# Patient Record
Sex: MALE | Race: BLACK OR AFRICAN AMERICAN | NOT HISPANIC OR LATINO | Employment: OTHER | URBAN - METROPOLITAN AREA
[De-identification: names, ages, dates, MRNs, and addresses within clinical notes are randomized per-mention and may not be internally consistent; named-entity substitution may affect disease eponyms.]

---

## 2017-04-06 ENCOUNTER — TELEPHONE (OUTPATIENT)
Dept: MEDICAL GROUP | Facility: PHYSICIAN GROUP | Age: 82
End: 2017-04-06

## 2017-04-06 NOTE — TELEPHONE ENCOUNTER
NEW PATIENT PRE-VISIT PLANNING    Called Jt Arevalo in order to verify health topics prior to the New appointment.     1.  All medications were updated? No Caregiver will bring in list she did not have it at this time.    2.  Allergies were updated? yes    3.  All care teams were updated? N\A       •   Gait devices, O2, CPAP, etc: yes Walker       •   Eye professional: N\A       •   Other specialists (GYN, cardiology, endo, etc): N\A    4.  All pharmacies were updated? yes          No current outpatient prescriptions on file.     No current facility-administered medications for this visit.       5.  Patient may be due for these Health Maintenance Topics (update any if possible):          There are no preventive care reminders to display for this patient.                       6.  Immunizations were updated in Epic using WebIZ?: No documentation in Web IZ        a. Web Iz Recommendations: Unknown          7.  Former PCP records requested?: no       a. If yes, request was sent to        8.  Notes to provider or MA:       a. Caregiver has medical records on pt she will bring to the appointment        b. Caregiver will bring in Medication list, she did not have it while we completed the NP PVP       Pt was encouraged to keep the appointment and to arrive at least 15-20 minutes early.

## 2017-04-17 ENCOUNTER — HOSPITAL ENCOUNTER (OUTPATIENT)
Dept: LAB | Facility: MEDICAL CENTER | Age: 82
End: 2017-04-17
Attending: FAMILY MEDICINE
Payer: MEDICARE

## 2017-04-17 ENCOUNTER — OFFICE VISIT (OUTPATIENT)
Dept: MEDICAL GROUP | Facility: PHYSICIAN GROUP | Age: 82
End: 2017-04-17
Payer: MEDICARE

## 2017-04-17 VITALS
HEART RATE: 56 BPM | WEIGHT: 138.01 LBS | HEIGHT: 65 IN | OXYGEN SATURATION: 98 % | RESPIRATION RATE: 16 BRPM | SYSTOLIC BLOOD PRESSURE: 118 MMHG | BODY MASS INDEX: 22.99 KG/M2 | TEMPERATURE: 97.2 F | DIASTOLIC BLOOD PRESSURE: 68 MMHG

## 2017-04-17 DIAGNOSIS — I25.10 CORONARY ARTERY DISEASE INVOLVING NATIVE CORONARY ARTERY OF NATIVE HEART WITHOUT ANGINA PECTORIS: ICD-10-CM

## 2017-04-17 DIAGNOSIS — K21.9 GASTROESOPHAGEAL REFLUX DISEASE WITHOUT ESOPHAGITIS: ICD-10-CM

## 2017-04-17 DIAGNOSIS — G31.84 MILD COGNITIVE IMPAIRMENT: ICD-10-CM

## 2017-04-17 DIAGNOSIS — Z87.39 HISTORY OF GOUT: ICD-10-CM

## 2017-04-17 DIAGNOSIS — R09.89 RUNNY NOSE: ICD-10-CM

## 2017-04-17 DIAGNOSIS — Z86.73 HISTORY OF CVA (CEREBROVASCULAR ACCIDENT): ICD-10-CM

## 2017-04-17 DIAGNOSIS — I10 ESSENTIAL HYPERTENSION: ICD-10-CM

## 2017-04-17 DIAGNOSIS — I69.30 HISTORY OF CEREBROVASCULAR ACCIDENT (CVA) WITH RESIDUAL DEFICIT: ICD-10-CM

## 2017-04-17 DIAGNOSIS — Z85.46 HISTORY OF PROSTATE CANCER: ICD-10-CM

## 2017-04-17 LAB
ALBUMIN SERPL BCP-MCNC: 3.9 G/DL (ref 3.2–4.9)
ALBUMIN/GLOB SERPL: 1.3 G/DL
ALP SERPL-CCNC: 106 U/L (ref 30–99)
ALT SERPL-CCNC: 27 U/L (ref 2–50)
ANION GAP SERPL CALC-SCNC: 8 MMOL/L (ref 0–11.9)
AST SERPL-CCNC: 21 U/L (ref 12–45)
BILIRUB SERPL-MCNC: 0.5 MG/DL (ref 0.1–1.5)
BUN SERPL-MCNC: 24 MG/DL (ref 8–22)
CALCIUM SERPL-MCNC: 9.7 MG/DL (ref 8.5–10.5)
CHLORIDE SERPL-SCNC: 101 MMOL/L (ref 96–112)
CHOLEST SERPL-MCNC: 245 MG/DL (ref 100–199)
CO2 SERPL-SCNC: 30 MMOL/L (ref 20–33)
CREAT SERPL-MCNC: 1.61 MG/DL (ref 0.5–1.4)
GFR SERPL CREATININE-BSD FRML MDRD: 41 ML/MIN/1.73 M 2
GLOBULIN SER CALC-MCNC: 3 G/DL (ref 1.9–3.5)
GLUCOSE SERPL-MCNC: 88 MG/DL (ref 65–99)
HDLC SERPL-MCNC: 50 MG/DL
LDLC SERPL CALC-MCNC: 145 MG/DL
POTASSIUM SERPL-SCNC: 4.4 MMOL/L (ref 3.6–5.5)
PROT SERPL-MCNC: 6.9 G/DL (ref 6–8.2)
SODIUM SERPL-SCNC: 139 MMOL/L (ref 135–145)
TRIGL SERPL-MCNC: 252 MG/DL (ref 0–149)
URATE SERPL-MCNC: 4.5 MG/DL (ref 2.5–8.3)

## 2017-04-17 PROCEDURE — 99204 OFFICE O/P NEW MOD 45 MIN: CPT | Performed by: FAMILY MEDICINE

## 2017-04-17 PROCEDURE — 1101F PT FALLS ASSESS-DOCD LE1/YR: CPT | Performed by: FAMILY MEDICINE

## 2017-04-17 PROCEDURE — 80053 COMPREHEN METABOLIC PANEL: CPT

## 2017-04-17 PROCEDURE — 84550 ASSAY OF BLOOD/URIC ACID: CPT

## 2017-04-17 PROCEDURE — 36415 COLL VENOUS BLD VENIPUNCTURE: CPT

## 2017-04-17 PROCEDURE — 1036F TOBACCO NON-USER: CPT | Performed by: FAMILY MEDICINE

## 2017-04-17 PROCEDURE — 4040F PNEUMOC VAC/ADMIN/RCVD: CPT | Mod: 8P | Performed by: FAMILY MEDICINE

## 2017-04-17 PROCEDURE — 80061 LIPID PANEL: CPT | Mod: GA

## 2017-04-17 PROCEDURE — G8420 CALC BMI NORM PARAMETERS: HCPCS | Performed by: FAMILY MEDICINE

## 2017-04-17 PROCEDURE — G8599 NO ASA/ANTIPLAT THER USE RNG: HCPCS | Performed by: FAMILY MEDICINE

## 2017-04-17 PROCEDURE — G8432 DEP SCR NOT DOC, RNG: HCPCS | Performed by: FAMILY MEDICINE

## 2017-04-17 RX ORDER — INDAPAMIDE 2.5 MG/1
2.5 TABLET ORAL DAILY
Refills: 1 | COMMUNITY
Start: 2017-04-13 | End: 2017-05-16

## 2017-04-17 RX ORDER — ALLOPURINOL 300 MG/1
300 TABLET ORAL DAILY
Refills: 1 | COMMUNITY
Start: 2017-04-13 | End: 2017-05-16 | Stop reason: SDUPTHER

## 2017-04-17 RX ORDER — GABAPENTIN 100 MG/1
100 CAPSULE ORAL 3 TIMES DAILY
Refills: 0 | COMMUNITY
Start: 2017-03-13 | End: 2017-05-16

## 2017-04-17 RX ORDER — COLCHICINE 0.6 MG/1
0.6 TABLET ORAL DAILY
Refills: 3 | COMMUNITY
Start: 2017-04-13 | End: 2017-05-16

## 2017-04-17 RX ORDER — LATANOPROST 50 UG/ML
0.01 SOLUTION/ DROPS OPHTHALMIC DAILY
Refills: 1 | COMMUNITY
Start: 2017-03-13 | End: 2018-07-11 | Stop reason: SDUPTHER

## 2017-04-17 RX ORDER — DONEPEZIL HYDROCHLORIDE 23 MG/1
23 TABLET, FILM COATED ORAL DAILY
Refills: 2 | COMMUNITY
Start: 2017-04-13 | End: 2017-06-27 | Stop reason: SDUPTHER

## 2017-04-17 RX ORDER — ATENOLOL 50 MG/1
50 TABLET ORAL DAILY
Refills: 1 | COMMUNITY
Start: 2017-04-13 | End: 2017-05-16 | Stop reason: SDUPTHER

## 2017-04-17 RX ORDER — OMEPRAZOLE 20 MG/1
20 CAPSULE, DELAYED RELEASE ORAL DAILY
Refills: 0 | COMMUNITY
Start: 2017-04-06 | End: 2017-05-16

## 2017-04-17 ASSESSMENT — PATIENT HEALTH QUESTIONNAIRE - PHQ9: CLINICAL INTERPRETATION OF PHQ2 SCORE: 0

## 2017-04-17 NOTE — PROGRESS NOTES
"Jt Arevalo is a 83 y.o. male here to establish care and discuss runny nose and dementia. He is accompanied by his nephew, Tc, who helps coordinate his care.     HPI:  Jt is a charming 83-year-old male here to establish. He recently moved from [place] to be close to his nephew. He resides at an assisted living facility. He was born in [place] and is a former . When I inquired what he did for a living, he tells me that he did \"everything.\"     Runny nose  This is patient's main concern today. He tells me that for several years, he has had a \"runny nose.\" It appears that it may have started shortly after his stroke. Occurs intermittently. He denies nose bleeds. Does not worsen when he eats. He does not use any over-the-counter nasal sprays or other medications. He tells me that he does not have allergies.    Mild cognitive impairment  According to Tc, patient has \"mild dementia.\" He is currently taking donepezil. Mainly has issues with short-term memory.    Essential hypertension  Stable. Currently taking atenolol 50mg, indapamide 2.5mg and nifedical XL 30mg as directed. He has been on this regimen for many years. Denies lightheadedness, vision changes, headache, palpitations or leg swelling.    History of cerebrovascular accident (CVA) with residual deficit  Hospitalized in 2006 for acute stroke. Tc tells me that he had carotid artery stenosis and had an operation. He is not currently on aspirin or other blood thinner. He is supposed to be on atorvastatin, but this is not on his most recent medication list. He does have residual right leg weakness. Uses a walker for help with ambulation. No recent falls.    Coronary artery disease involving native coronary artery of native heart without angina pectoris  Per patient history. He tells me he has a cardiac stent. Unclear if he is on aspirin, blood thinner or statin medication.    History of prostate cancer  Per patient history. He was treated " with brachytherapy. Has not had an recent PSA tests. Denies abdominal pain, urination issues or back pain.    Gout  Denies recent flare-ups. Currently taking allopurinol 300 mg. He also has colchicine on his medication list. Jt tells me he takes it everyday. Denies side effects from medication.     Gastroesophageal reflux disease without esophagitis  Taking omeprazole daily. No early satiety, unintentional weight loss, choking, persistent burning pain in chest or upper abdomen.    Current medicines (including changes today)  Current Outpatient Prescriptions   Medication Sig Dispense Refill   • allopurinol (ZYLOPRIM) 300 MG Tab Take 300 mg by mouth every day.  1   • atenolol (TENORMIN) 50 MG Tab Take 50 mg by mouth every day.  1   • colchicine (COLCRYS) 0.6 MG Tab Take 0.6 mg by mouth every day.  3   • Donepezil HCl 23 MG Tab Take 23 mg by mouth every day.  2   • gabapentin (NEURONTIN) 100 MG Cap Take 100 mg by mouth 3 times a day.  0   • indapamide (LOZOL) 2.5 MG Tab Take 2.5 mg by mouth every day.  1   • latanoprost (XALATAN) 0.005 % Solution Place 0.005 Drops in both eyes every day.  1   • NIFEDICAL XL 30 MG tablet Take 30 mg by mouth every day.  0   • omeprazole (PRILOSEC) 20 MG delayed-release capsule Take 20 mg by mouth every day.  0     No current facility-administered medications for this visit.     He  has a past medical history of Anxiety; Arthritis; Cancer (CMS-HCC); Cataract; Depression; Glaucoma; Hypertension; IBD (inflammatory bowel disease); Muscle disorder; and Stroke (CMS-HCC).  He  has past surgical history that includes eye surgery; stent placement; and prostatectomy, radical retro.  Social History   Substance Use Topics   • Smoking status: Former Smoker     Quit date: 04/06/1991   • Smokeless tobacco: Never Used   • Alcohol Use: 0.6 oz/week     1 Shots of liquor per week      Comment: rare     Social History     Social History Narrative     Family History   Problem Relation Age of Onset   • No  "Known Problems Mother    • No Known Problems Father    • No Known Problems Sister    • No Known Problems Brother    • No Known Problems Brother      Family Status   Relation Status Death Age   • Mother     • Father     • Sister Alive    • Brother     • Brother       ROS  Constitutional: Negative for fever, chills and malaise/fatigue.   HENT: See HPI.  Eyes: Negative for pain.   Respiratory: Negative for cough and shortness of breath.    Cardiovascular: Negative for leg swelling.   Gastrointestinal: Negative for nausea, vomiting, abdominal pain and diarrhea.   Genitourinary: Negative for dysuria and hematuria.   Skin: Negative for rash.   Neurological: Negative for dizziness, focal weakness and headaches.   Endo/Heme/Allergies: Does not bruise/bleed easily.   Psychiatric/Behavioral: Negative for depression.  The patient is not nervous/anxious.       Objective:     Physical Exam:  Blood pressure 118/68, pulse 56, temperature 36.2 °C (97.2 °F), resp. rate 16, height 1.638 m (5' 4.5\"), weight 62.6 kg (138 lb 0.1 oz), SpO2 98 %. Body mass index is 23.33 kg/(m^2).  Constitutional: Alert, elderly male in no distress.  Skin: Warm, dry, good turgor, no rashes in visible areas.  Eye: Equal, round and reactive, conjunctiva clear, lids normal.  ENMT: Hearing aide in right ear. TM's clear bilaterally, lips without lesions, good dentition, oropharynx clear.  Neck: Trachea midline, no masses, no thyromegaly. No cervical or supraclavicular lymphadenopathy.  Respiratory: Unlabored respiratory effort, lungs clear to auscultation, no wheezes, no ronchi.  Cardiovascular: Normal S1, S2, no murmur, no edema.  Abdomen: Soft, non-tender, no masses, no hepatosplenomegaly.  MSK: Steady gait with FWW. MARTÍNEZ with full ROM.  Neuro: Alert and oriented x 3. DTRs 2+ and symmetric. No cranial nerve deficit. Strength decreased in right leg. Sensation intact.  Psych: Normal affect and mood.    Assessment and Plan:     1. " Runny nose  Unclear etiology. Reassuring exam. Supportive care and trial of Flonase advised.    2. Mild cognitive impairment  Short-term memory. Resides in assisted living and able to perform ADL's. Continue donepezil and continue to monitor.     3. Essential hypertension  Blood pressure well-controlled. Labs as indicated. Consider decreasing or eliminating one of his antihypertensives at a future visit. Discussed decreasing salt intake. Emphasized benefits of exercise and diet. Continue to monitor.  - COMP METABOLIC PANEL; Future  - LIPID PROFILE; Future    4. History of cerebrovascular accident (CVA) with residual deficit  Per patient history. Advised restarting aspirin and ensuring he is on statin therapy. Instructed to bring in medication packets at next appointment for review.  - COMP METABOLIC PANEL; Future  - LIPID PROFILE; Future    5. Coronary artery disease involving native coronary artery of native heart without angina pectoris  He is s/p cardiac stent. Check labs. Again, advised aspirin and statin treatment.  - COMP METABOLIC PANEL; Future  - LIPID PROFILE; Future    6. History of prostate cancer  Per patient history. Requesting records. Hold off on PSA test for now.    7. History of gout  No recent exacerbations. Unclear why he is taking colchicine daily. Check uric acid. Consider discontinuing colchicine at next visit.  - URIC ACID; Future    8. Gastroesophageal reflux disease without esophagitis  No recent symptoms. Consider discontinuing PPI at next appointment.    Records requested from previous PCP.  Followup: Return in about 4 weeks (around 5/15/2017) for med management, f/u labs, short.         PLEASE NOTE: This dictation was created using voice recognition software. I have made every reasonable attempt to correct obvious errors, but I expect that there are errors of grammar and possibly content that I did not discover before finalizing the note.

## 2017-04-17 NOTE — MR AVS SNAPSHOT
"        Jt Arevalo   2017 10:20 AM   Office Visit   MRN: [id number]    Department:  [facility]   Dept Phone:  [phone]    Description:  Male : 1933   Provider:  Kathrine Melgoza M.D.           Reason for Visit     Allergic Reaction     Runny Nose     Dementia           Allergies as of 2017     No Known Allergies      You were diagnosed with     History of CVA (cerebrovascular accident)   [507494]       History of prostate cancer   [830162]       Essential hypertension   [5754509]       History of gout   [187721]       Coronary artery disease involving native coronary artery of native heart without angina pectoris   [0602942]       Mild cognitive impairment   [051698]       History of cerebrovascular accident (CVA) with residual deficit   [7142238]         Vital Signs     Blood Pressure Pulse Temperature Respirations Height Weight    118/68 mmHg 56 36.2 °C (97.2 °F) 16 1.638 m (5' 4.5\") 62.6 kg (138 lb 0.1 oz)    Body Mass Index Oxygen Saturation Smoking Status             23.33 kg/m2 98% Former Smoker         Basic Information     Date Of Birth Sex Race Ethnicity Preferred Language    1933 Male Black or  Non- English      Your appointments     May 16, 2017 10:40 AM   Established Patient with Kathrine Melgoza M.D.   [facility] (--)    [address]  [address]  [facility] [address] [phone]           You will be receiving a confirmation call a few days before your appointment from our automated call confirmation system.              Problem List              ICD-10-CM Priority Class Noted - Resolved    History of prostate cancer Z85.46   2017 - Present    Essential hypertension I10   2017 - Present    History of gout Z87.39   2017 - Present    Coronary artery disease involving native coronary artery of native heart without angina pectoris I25.10   2017 - Present    Mild cognitive impairment G31.84   2017 - Present   " History of cerebrovascular accident (CVA) with residual deficit I69.30   4/17/2017 - Present      Health Maintenance        Date Due Completion Dates    IMM DTaP/Tdap/Td Vaccine (1 - Tdap) 12/14/1952 ---    IMM ZOSTER VACCINE 12/14/1993 ---    IMM PNEUMOCOCCAL 65+ (ADULT) LOW/MEDIUM RISK SERIES (1 of 2 - PCV13) 12/14/1998 ---            Current Immunizations     No immunizations on file.      Below and/or attached are the medications your provider expects you to take. Review all of your home medications and newly ordered medications with your provider and/or pharmacist. Follow medication instructions as directed by your provider and/or pharmacist. Please keep your medication list with you and share with your provider. Update the information when medications are discontinued, doses are changed, or new medications (including over-the-counter products) are added; and carry medication information at all times in the event of emergency situations     Allergies:  No Known Allergies          Medications  Valid as of: April 17, 2017 - 10:31 AM    Generic Name Brand Name Tablet Size Instructions for use    Allopurinol (Tab) ZYLOPRIM 300 MG Take 300 mg by mouth every day.        Atenolol (Tab) TENORMIN 50 MG Take 50 mg by mouth every day.        Colchicine (Tab) COLCRYS 0.6 MG Take 0.6 mg by mouth every day.        Donepezil HCl (Tab) Donepezil HCl 23 MG Take 23 mg by mouth every day.        Gabapentin (Cap) NEURONTIN 100 MG Take 100 mg by mouth 3 times a day.        Indapamide (Tab) LOZOL 2.5 MG Take 2.5 mg by mouth every day.        Latanoprost (Solution) XALATAN 0.005 % Place 0.005 Drops in both eyes every day.        NIFEdipine (TABLET SR 24 HR) NIFEDICAL XL 30 MG Take 30 mg by mouth every day.        Omeprazole (CAPSULE DELAYED RELEASE) PRILOSEC 20 MG Take 20 mg by mouth every day.        .                 Medicines prescribed today were sent to:     [facility] - [address] [place] [address]    Phone: [phone] Fax: [phone]    Open 24 Hours?: No      Medication refill instructions:       If your prescription bottle indicates you have medication refills left, it is not necessary to call your provider’s office. Please contact your pharmacy and they will refill your medication.    If your prescription bottle indicates you do not have any refills left, you may request refills at any time through one of the following ways: The online NOW! Innovations system (except Urgent Care), by calling your provider’s office, or by asking your pharmacy to contact your provider’s office with a refill request. Medication refills are processed only during regular business hours and may not be available until the next business day. Your provider may request additional information or to have a follow-up visit with you prior to refilling your medication.   *Please Note: Medication refills are assigned a new Rx number when refilled electronically. Your pharmacy may indicate that no refills were authorized even though a new prescription for the same medication is available at the pharmacy. Please request the medicine by name with the pharmacy before contacting your provider for a refill.        Your To Do List     Future Labs/Procedures Complete By Expires    COMP METABOLIC PANEL  As directed 4/18/2018    LIPID PROFILE  As directed 4/18/2018    URIC ACID  As directed 4/18/2018         NOW! Innovations Access Code: [email or web address]  Expires: 5/17/2017  9:46 AM    NOW! Innovations  A secure, online tool to manage your health information     [employer]’s NOW! Innovations® is a secure, online tool that connects you to your personalized health information from the privacy of your home -- day or night - making it very easy for you to manage your healthcare. Once the activation process is completed, you can even access your medical information using the NOW! Innovations kristina, which is available for free in the Apple Kristina store or [employer]  Play store.     HomeSav provides the following levels of access (as shown below):   My Chart Features   [facility] Doctor [facility]  Specialists [facility]  Urgent  Care Non-[facility]  Primary Care  Doctor   Email your healthcare team securely and privately 24/7 X X X    Manage appointments: schedule your next appointment; view details of past/upcoming appointments X      Request prescription refills. X      View recent personal medical records, including lab and immunizations X X X X   View health record, including health history, allergies, medications X X X X   Read reports about your outpatient visits, procedures, consult and ER notes X X X X   See your discharge summary, which is a recap of your hospital and/or ER visit that includes your diagnosis, lab results, and care plan. X X       How to register for HomeSav:  1. Go to  [email or web address].  2. Click on the Sign Up Now box, which takes you to the New Member Sign Up page. You will need to provide the following information:  a. Enter your HomeSav Access Code exactly as it appears at the top of this page. (You will not need to use this code after you’ve completed the sign-up process. If you do not sign up before the expiration date, you must request a new code.)   b. Enter your date of birth.   c. Enter your home email address.   d. Click Submit, and follow the next screen’s instructions.  3. Create a HomeSav ID. This will be your HomeSav login ID and cannot be changed, so think of one that is secure and easy to remember.  4. Create a HomeSav password. You can change your password at any time.  5. Enter your Password Reset Question and Answer. This can be used at a later time if you forget your password.   6. Enter your e-mail address. This allows you to receive e-mail notifications when new information is available in HomeSav.  7. Click Sign Up. You can now view your health information.    For assistance activating your HomeSav account, call [phone]

## 2017-04-17 NOTE — Clinical Note
[facility]  Kathrine Melgoza M.D.  [address]  [place] [address]  Fax: [phone]   Authorization for Release/Disclosure of   Protected Health Information   Name: HECTOR AREVALO : 1933 SSN: [id number]   Address: [facility] [address] [place] [address] Phone:    [phone] (home)    I authorize the entity listed below to release/disclose the PHI below to:   [facility]/Kathrine Melgoza M.D. and Kathrine Melgoza M.D.   Provider or Entity Name:      [place], [place]  Phone:      Fax:     Reason for request: continuity of care   Information to be released:    [  ] LAST COLONOSCOPY,  including any PATH REPORT and follow-up  [  ] LAST FIT/COLOGUARD RESULT [  ] LAST DEXA  [  ] LAST MAMMOGRAM  [  ] LAST PAP  [  ] LAST LABS [  ] RETINA EXAM REPORT  [  ] IMMUNIZATION RECORDS  [  ] Release all info      [  ] Check here and initial the line next to each item to release ALL health information INCLUDING  _____ Care and treatment for drug and / or alcohol abuse  _____ HIV testing, infection status, or AIDS  _____ Genetic Testing    DATES OF SERVICE OR TIME PERIOD TO BE DISCLOSED: _____________  I understand and acknowledge that:  * This Authorization may be revoked at any time by you in writing, except if your health information has already been used or disclosed.  * Your health information that will be used or disclosed as a result of you signing this authorization could be re-disclosed by the recipient. If this occurs, your re-disclosed health information may no longer be protected by State or Federal laws.  * You may refuse to sign this Authorization. Your refusal will not affect your ability to obtain treatment.  * This Authorization becomes effective upon signing and will  on (date) __________.      If no date is indicated, this Authorization will  one (1) year from the signature date.    Name: Hector Arevalo    Signature:   Date:     2017       PLEASE  FAX REQUESTED RECORDS BACK TO: [phone]

## 2017-04-18 ENCOUNTER — TELEPHONE (OUTPATIENT)
Dept: MEDICAL GROUP | Facility: PHYSICIAN GROUP | Age: 82
End: 2017-04-18

## 2017-04-18 NOTE — TELEPHONE ENCOUNTER
1. Caller Name: Jt Arevalo                      Call Back Number: [phone] (home)     2. Message: Patient notified of the results.     3. Patient approves office to leave a detailed voicemail/MyChart message: N\A

## 2017-04-18 NOTE — TELEPHONE ENCOUNTER
----- Message from Kathrine Melgoza M.D. sent at 4/18/2017  6:37 AM PDT -----  Please let patient or his nephew (Tc) know that Jt's labs show a slightly decreased kidney function and elevated cholesterol. We can discuss further at his follow-up appointment.  Kathrine Melgoza M.D.

## 2017-05-16 ENCOUNTER — OFFICE VISIT (OUTPATIENT)
Dept: MEDICAL GROUP | Facility: PHYSICIAN GROUP | Age: 82
End: 2017-05-16
Payer: MEDICARE

## 2017-05-16 VITALS
WEIGHT: 136.91 LBS | TEMPERATURE: 97.2 F | DIASTOLIC BLOOD PRESSURE: 66 MMHG | RESPIRATION RATE: 16 BRPM | HEIGHT: 65 IN | HEART RATE: 60 BPM | BODY MASS INDEX: 22.81 KG/M2 | OXYGEN SATURATION: 98 % | SYSTOLIC BLOOD PRESSURE: 134 MMHG

## 2017-05-16 DIAGNOSIS — I10 ESSENTIAL HYPERTENSION: ICD-10-CM

## 2017-05-16 DIAGNOSIS — I25.10 CORONARY ARTERY DISEASE INVOLVING NATIVE CORONARY ARTERY OF NATIVE HEART WITHOUT ANGINA PECTORIS: ICD-10-CM

## 2017-05-16 DIAGNOSIS — R94.4 DECREASED GFR: ICD-10-CM

## 2017-05-16 DIAGNOSIS — Z79.899 POLYPHARMACY: ICD-10-CM

## 2017-05-16 DIAGNOSIS — K21.9 GASTROESOPHAGEAL REFLUX DISEASE WITHOUT ESOPHAGITIS: ICD-10-CM

## 2017-05-16 DIAGNOSIS — G31.84 MILD COGNITIVE IMPAIRMENT: ICD-10-CM

## 2017-05-16 DIAGNOSIS — E78.00 PURE HYPERCHOLESTEROLEMIA: ICD-10-CM

## 2017-05-16 DIAGNOSIS — Z87.39 HISTORY OF GOUT: ICD-10-CM

## 2017-05-16 DIAGNOSIS — I69.30 HISTORY OF CEREBROVASCULAR ACCIDENT (CVA) WITH RESIDUAL DEFICIT: ICD-10-CM

## 2017-05-16 PROCEDURE — 4040F PNEUMOC VAC/ADMIN/RCVD: CPT | Mod: 8P | Performed by: FAMILY MEDICINE

## 2017-05-16 PROCEDURE — G8420 CALC BMI NORM PARAMETERS: HCPCS | Performed by: FAMILY MEDICINE

## 2017-05-16 PROCEDURE — 1036F TOBACCO NON-USER: CPT | Performed by: FAMILY MEDICINE

## 2017-05-16 PROCEDURE — G8432 DEP SCR NOT DOC, RNG: HCPCS | Performed by: FAMILY MEDICINE

## 2017-05-16 PROCEDURE — G8598 ASA/ANTIPLAT THER USED: HCPCS | Performed by: FAMILY MEDICINE

## 2017-05-16 PROCEDURE — 99214 OFFICE O/P EST MOD 30 MIN: CPT | Performed by: FAMILY MEDICINE

## 2017-05-16 PROCEDURE — 1101F PT FALLS ASSESS-DOCD LE1/YR: CPT | Performed by: FAMILY MEDICINE

## 2017-05-16 RX ORDER — ATENOLOL 50 MG/1
50 TABLET ORAL DAILY
Qty: 30 TAB | Refills: 11 | Status: SHIPPED | OUTPATIENT
Start: 2017-05-16 | End: 2017-10-25

## 2017-05-16 RX ORDER — ATORVASTATIN CALCIUM 10 MG/1
10 TABLET, FILM COATED ORAL EVERY MORNING
Qty: 30 TAB | Refills: 11 | Status: SHIPPED | OUTPATIENT
Start: 2017-05-16 | End: 2017-08-22

## 2017-05-16 RX ORDER — ALLOPURINOL 300 MG/1
300 TABLET ORAL DAILY
Qty: 30 TAB | Refills: 11 | Status: SHIPPED | OUTPATIENT
Start: 2017-05-16 | End: 2017-08-22

## 2017-05-16 NOTE — ASSESSMENT & PLAN NOTE
Patient has not had heartburn symptoms for several years. He is taking omeprazole daily. No adverse effects.

## 2017-05-16 NOTE — ASSESSMENT & PLAN NOTE
Patient's cholesterol elevated on recent lab work. Reviewed his list of medications and I do not see a cholesterol lowering medication. His caregivers believe that he was on a cholesterol-lowering medication in the past. They think that maybe with the move, the medication was lost. He does have a history of heart attack and stroke.

## 2017-05-16 NOTE — ASSESSMENT & PLAN NOTE
Patient has a history of gout, but has not had any attacks in more than 10 years. He is currently taking allopurinol 30 mg daily and colchicine 0.6 mg daily.

## 2017-05-16 NOTE — ASSESSMENT & PLAN NOTE
Stable. Patient's caregiver, Nohemi, does state that he does not perform ADLs as well as he used to. He spends most of his day lying in bed and watching TV. He is at an assisted facility for dementia.

## 2017-05-16 NOTE — PROGRESS NOTES
Subjective:   Jt Arevalo is a 83 y.o. male here today for follow-up medication management and lab results. He is accompanied by his caregivers, Boaz and Nohemi.    Decreased GFR  On patient's most recent blood work, his creatinine was elevated at 1.6 and GFR was decreased at 50. His caregivers deny ever hearing that he had issues with his kidneys. He was taking colchicine daily.  he does admit to not drinking a lot of water. He does have significant history of coronary artery disease, hypertension and CVA.    Pure hypercholesterolemia  Patient's cholesterol elevated on recent lab work. Reviewed his list of medications and I do not see a cholesterol lowering medication. His caregivers believe that he was on a cholesterol-lowering medication in the past. They think that maybe with the move, the medication was lost. He does have a history of heart attack and stroke.    Essential hypertension  Stable blood pressure. He is currently on 3 antihypertensive medications. Denies adverse effects.    Mild cognitive impairment  Stable. Patient's caregiver, Nohemi, does state that he does not perform ADLs as well as he used to. He spends most of his day lying in bed and watching TV. He is at an assisted facility for dementia.    Gastroesophageal reflux disease without esophagitis  Patient has not had heartburn symptoms for several years. He is taking omeprazole daily. No adverse effects.    History of gout  Patient has a history of gout, but has not had any attacks in more than 10 years. He is currently taking allopurinol 30 mg daily and colchicine 0.6 mg daily.     Current medicines (including changes today)  Current Outpatient Prescriptions   Medication Sig Dispense Refill   • aspirin EC (ECOTRIN) 81 MG Tablet Delayed Response Take 81 mg by mouth every day.     • atorvastatin (LIPITOR) 10 MG Tab Take 1 Tab by mouth every morning. 30 Tab 11   • allopurinol (ZYLOPRIM) 300 MG Tab Take 300 mg by mouth every day.  1   • atenolol  "(TENORMIN) 50 MG Tab Take 50 mg by mouth every day.  1   • Donepezil HCl 23 MG Tab Take 23 mg by mouth every day.  2   • latanoprost (XALATAN) 0.005 % Solution Place 0.005 Drops in both eyes every day.  1   • NIFEDICAL XL 30 MG tablet Take 30 mg by mouth every day.  0     No current facility-administered medications for this visit.     He  has a past medical history of Anxiety; Arthritis; Cancer (CMS-HCC); Cataract; Depression; Glaucoma; Hypertension; IBD (inflammatory bowel disease); Muscle disorder; and Stroke (CMS-[facility]).    ROS   See above. No chest pain, no shortness of breath, no abdominal pain.     Objective:     Physical Exam:  Blood pressure 134/66, pulse 60, temperature 36.2 °C (97.2 °F), resp. rate 16, height 1.638 m (5' 4.5\"), weight 62.1 kg (136 lb 14.5 oz), SpO2 98 %. Body mass index is 23.15 kg/(m^2).   Constitutional: Alert, elderly male in no distress.  Skin: Warm, dry, good turgor, no rashes in visible areas.  Eye: Conjunctiva clear, lids normal.  ENMT: Hearing aide in right ear. Lips without lesions, good dentition, oropharynx clear.  Neck: Trachea midline, no masses, no thyromegaly.  Respiratory: Unlabored respiratory effort, lungs clear to auscultation, no wheezes, no ronchi.  Cardiovascular: Normal S1, S2, no murmur, no edema.  Abdomen: Soft, non-tender, no masses, no hepatosplenomegaly.  MSK: Steady gait with FWW. MARTÍNEZ with full ROM.  Neuro: Alert and oriented x 3. DTRs 2+ and symmetric. No cranial nerve deficit. Strength decreased in right leg. Sensation intact.  Psych: Normal affect and mood.    Assessment and Plan:     1. Coronary artery disease involving native coronary artery of native heart without angina pectoris  2. History of cerebrovascular accident (CVA) with residual deficit  3. Pure hypercholesterolemia  Cholesterol noted to be elevated on recent lab work. He does have a history of both heart attack and CVA and it is recommended that he continue statin medication. I prescribed him a " small amount of atorvastatin. Possible adverse effects discussed.  - atorvastatin (LIPITOR) 10 MG Tab; Take 1 Tab by mouth every morning.  Dispense: 30 Tab; Refill: 11    4. Decreased GFR  This is a new problem. Elevated creatinine and decreased GFR noted on recent lab work. Unclear at this time if he has chronic kidney disease or may simply have an abnormality due to medication side effect and dehydration. We'll recheck BMP in 2 months. This will be nonfasting.  - BASIC METABOLIC PANEL; Future    5. Essential hypertension  Well-controlled. We'll discontinue indapamide. Continue atenolol and Nifedical.  - BASIC METABOLIC PANEL; Future    6. Mild cognitive impairment  Chronic and stable. Continue donepezil.    7. Gastroesophageal reflux disease without esophagitis  Stable. Patient has not had symptoms in many years. Discontinue omeprazole.    8. History of gout  Stable. Patient has not had any recent flares in some time. Discontinue colchicine. Continue allopurinol.    9. Polypharmacy  Lengthy discussion with patient and his caregivers regarding his medications. He is at risk for polypharmacy and all unnecessary medications were discontinued today. At future visits, I hope to further widdle down his medication list.    Followup: Return in about 3 months (around 8/16/2017) for f/u med management, kidney results, short.         PLEASE NOTE: This dictation was created using voice recognition software. I have made every reasonable attempt to correct obvious errors, but I expect that there are errors of grammar and possibly content that I did not discover before finalizing the note.

## 2017-05-16 NOTE — ASSESSMENT & PLAN NOTE
On patient's most recent blood work, his creatinine was elevated at 1.6 and GFR was decreased at 50. His caregivers deny ever hearing that he had issues with his kidneys. He was taking colchicine daily.  he does admit to not drinking a lot of water. He does have significant history of coronary artery disease, hypertension and CVA.

## 2017-05-16 NOTE — MR AVS SNAPSHOT
"        Jt Arevalo   2017 10:40 AM   Office Visit   MRN: [id number]    Department:  [facility]   Dept Phone:  [phone]    Description:  Male : 1933   Provider:  Kathrine Melgoza M.D.           Reason for Visit     Results Labs       Allergies as of 2017     No Known Allergies      You were diagnosed with     Coronary artery disease involving native coronary artery of native heart without angina pectoris   [0662108]       History of cerebrovascular accident (CVA) with residual deficit   [5957265]       Pure hypercholesterolemia   [272.0.ICD-9-CM]       Decreased GFR   [597169]       Essential hypertension   [9098195]       Mild cognitive impairment   [162333]       Gastroesophageal reflux disease without esophagitis   [688437]       History of gout   [450278]         Vital Signs     Blood Pressure Pulse Temperature Respirations Height Weight    134/66 mmHg 60 36.2 °C (97.2 °F) 16 1.638 m (5' 4.5\") 62.1 kg (136 lb 14.5 oz)    Body Mass Index Oxygen Saturation Smoking Status             23.15 kg/m2 98% Former Smoker         Basic Information     Date Of Birth Sex Race Ethnicity Preferred Language    1933 Male Black or  Non- English      Your appointments     Aug 22, 2017 10:40 AM   Established Patient with Kathrine Melgoza M.D.   [facility] (--)    [address]  [address]  [facility] [id number] [phone]           You will be receiving a confirmation call a few days before your appointment from our automated call confirmation system.              Problem List              ICD-10-CM Priority Class Noted - Resolved    History of prostate cancer Z85.46   2017 - Present    Essential hypertension I10   2017 - Present    History of gout Z87.39   2017 - Present    Coronary artery disease involving native coronary artery of native heart without angina pectoris I25.10   2017 - Present    Mild cognitive impairment G31.84   2017 - " Present    History of cerebrovascular accident (CVA) with residual deficit I69.30   4/17/2017 - Present    Gastroesophageal reflux disease without esophagitis K21.9   4/17/2017 - Present    Decreased GFR R94.4   5/16/2017 - Present      Health Maintenance        Date Due Completion Dates    IMM DTaP/Tdap/Td Vaccine (1 - Tdap) 12/14/1952 ---    IMM ZOSTER VACCINE 12/14/1993 ---    IMM PNEUMOCOCCAL 65+ (ADULT) LOW/MEDIUM RISK SERIES (1 of 2 - PCV13) 12/14/1998 ---            Current Immunizations     No immunizations on file.      Below and/or attached are the medications your provider expects you to take. Review all of your home medications and newly ordered medications with your provider and/or pharmacist. Follow medication instructions as directed by your provider and/or pharmacist. Please keep your medication list with you and share with your provider. Update the information when medications are discontinued, doses are changed, or new medications (including over-the-counter products) are added; and carry medication information at all times in the event of emergency situations     Allergies:  No Known Allergies          Medications  Valid as of: May 16, 2017 - 10:57 AM    Generic Name Brand Name Tablet Size Instructions for use    Allopurinol (Tab) ZYLOPRIM 300 MG Take 300 mg by mouth every day.        Aspirin (Tablet Delayed Response) ECOTRIN 81 MG Take 81 mg by mouth every day.        Atenolol (Tab) TENORMIN 50 MG Take 50 mg by mouth every day.        Atorvastatin Calcium (Tab) LIPITOR 10 MG Take 1 Tab by mouth every morning.        Donepezil HCl (Tab) Donepezil HCl 23 MG Take 23 mg by mouth every day.        Latanoprost (Solution) XALATAN 0.005 % Place 0.005 Drops in both eyes every day.        NIFEdipine (TABLET SR 24 HR) NIFEDICAL XL 30 MG Take 30 mg by mouth every day.        .                 Medicines prescribed today were sent to:     [facility] [place] [place] - [address]    [address] [address]    Phone: [phone] Fax: [phone]    Open 24 Hours?: No      Medication refill instructions:       If your prescription bottle indicates you have medication refills left, it is not necessary to call your provider’s office. Please contact your pharmacy and they will refill your medication.    If your prescription bottle indicates you do not have any refills left, you may request refills at any time through one of the following ways: The online Pull system (except Urgent Care), by calling your provider’s office, or by asking your pharmacy to contact your provider’s office with a refill request. Medication refills are processed only during regular business hours and may not be available until the next business day. Your provider may request additional information or to have a follow-up visit with you prior to refilling your medication.   *Please Note: Medication refills are assigned a new Rx number when refilled electronically. Your pharmacy may indicate that no refills were authorized even though a new prescription for the same medication is available at the pharmacy. Please request the medicine by name with the pharmacy before contacting your provider for a refill.        Your To Do List     Future Labs/Procedures Complete By Any[employer]    BASIC METABOLIC PANEL  As directed 5/17/2018         Pull Access Code: [email or web address]  Expires: 5/17/2017  9:46 AM    Pull  A secure, online tool to manage your health information     [employer] Pull® is a secure, online tool that connects you to your personalized health information from the privacy of your home -- day or night - making it very easy for you to manage your healthcare. Once the activation process is completed, you can even access your medical information using the Pull kristina, which is available for free in the Apple Kristina store or Google Play store.     Pull provides the following levels of access (as shown  below):   My Chart Features   [facility] Doctor [facility]  Urgent  Care Non-[facility]  Primary Care  Doctor   Email your healthcare team securely and privately 24/7 X X X    Manage appointments: schedule your next appointment; view details of past/upcoming appointments X      Request prescription refills. X      View recent personal medical records, including lab and immunizations X X X X   View health record, including health history, allergies, medications X X X X   Read reports about your outpatient visits, procedures, consult and ER notes X X X X   See your discharge summary, which is a recap of your hospital and/or ER visit that includes your diagnosis, lab results, and care plan. X X       How to register for [employer]:  1. Go to  [email or web address].  2. Click on the Sign Up Now box, which takes you to the New Member Sign Up page. You will need to provide the following information:  a. Enter your [employer] Reach Access Code exactly as it appears at the top of this page. (You will not need to use this code after you’ve completed the sign-up process. If you do not sign up before the expiration date, you must request a new code.)   b. Enter your date of birth.   c. Enter your home email address.   d. Click Submit, and follow the next screen’s instructions.  3. Create a Atomic Reach ID. This will be your [employer] Reach login ID and cannot be changed, so think of one that is secure and easy to remember.  4. Create a [employer] Reach password. You can change your password at any time.  5. Enter your Password Reset Question and Answer. This can be used at a later time if you forget your password.   6. Enter your e-mail address. This allows you to receive e-mail notifications when new information is available in Atomic Reach.  7. Click Sign Up. You can now view your health information.    For assistance activating your [employer] Reach account, call [phone]

## 2017-06-27 RX ORDER — DONEPEZIL HYDROCHLORIDE 23 MG/1
23 TABLET, FILM COATED ORAL DAILY
Qty: 90 TAB | Refills: 3 | Status: SHIPPED | OUTPATIENT
Start: 2017-06-27 | End: 2018-04-04

## 2017-08-15 ENCOUNTER — HOSPITAL ENCOUNTER (OUTPATIENT)
Dept: LAB | Facility: MEDICAL CENTER | Age: 82
End: 2017-08-15
Attending: FAMILY MEDICINE
Payer: MEDICARE

## 2017-08-15 DIAGNOSIS — I10 ESSENTIAL HYPERTENSION: ICD-10-CM

## 2017-08-15 DIAGNOSIS — R94.4 DECREASED GFR: ICD-10-CM

## 2017-08-15 LAB
ANION GAP SERPL CALC-SCNC: 7 MMOL/L (ref 0–11.9)
BUN SERPL-MCNC: 14 MG/DL (ref 8–22)
CALCIUM SERPL-MCNC: 9.5 MG/DL (ref 8.5–10.5)
CHLORIDE SERPL-SCNC: 107 MMOL/L (ref 96–112)
CO2 SERPL-SCNC: 25 MMOL/L (ref 20–33)
CREAT SERPL-MCNC: 1.28 MG/DL (ref 0.5–1.4)
GFR SERPL CREATININE-BSD FRML MDRD: 54 ML/MIN/1.73 M 2
GLUCOSE SERPL-MCNC: 81 MG/DL (ref 65–99)
POTASSIUM SERPL-SCNC: 4.4 MMOL/L (ref 3.6–5.5)
SODIUM SERPL-SCNC: 139 MMOL/L (ref 135–145)

## 2017-08-15 PROCEDURE — 36415 COLL VENOUS BLD VENIPUNCTURE: CPT

## 2017-08-15 PROCEDURE — 80048 BASIC METABOLIC PNL TOTAL CA: CPT

## 2017-08-22 ENCOUNTER — OFFICE VISIT (OUTPATIENT)
Dept: MEDICAL GROUP | Facility: PHYSICIAN GROUP | Age: 82
End: 2017-08-22
Payer: MEDICARE

## 2017-08-22 VITALS
BODY MASS INDEX: 23.9 KG/M2 | WEIGHT: 139.99 LBS | OXYGEN SATURATION: 95 % | TEMPERATURE: 97.5 F | SYSTOLIC BLOOD PRESSURE: 90 MMHG | DIASTOLIC BLOOD PRESSURE: 64 MMHG | HEIGHT: 64 IN | RESPIRATION RATE: 12 BRPM | HEART RATE: 56 BPM

## 2017-08-22 DIAGNOSIS — Z91.89 AT RISK FOR POLYPHARMACY: ICD-10-CM

## 2017-08-22 DIAGNOSIS — Z87.39 HISTORY OF GOUT: ICD-10-CM

## 2017-08-22 DIAGNOSIS — E78.00 PURE HYPERCHOLESTEROLEMIA: ICD-10-CM

## 2017-08-22 DIAGNOSIS — I69.30 HISTORY OF CEREBROVASCULAR ACCIDENT (CVA) WITH RESIDUAL DEFICIT: ICD-10-CM

## 2017-08-22 DIAGNOSIS — I25.10 CORONARY ARTERY DISEASE INVOLVING NATIVE CORONARY ARTERY OF NATIVE HEART WITHOUT ANGINA PECTORIS: ICD-10-CM

## 2017-08-22 DIAGNOSIS — I10 ESSENTIAL HYPERTENSION: ICD-10-CM

## 2017-08-22 PROBLEM — R94.4 DECREASED GFR: Status: RESOLVED | Noted: 2017-05-16 | Resolved: 2017-08-22

## 2017-08-22 PROBLEM — K21.9 GASTROESOPHAGEAL REFLUX DISEASE WITHOUT ESOPHAGITIS: Status: RESOLVED | Noted: 2017-04-17 | Resolved: 2017-08-22

## 2017-08-22 PROCEDURE — 99214 OFFICE O/P EST MOD 30 MIN: CPT | Performed by: FAMILY MEDICINE

## 2017-08-22 NOTE — MR AVS SNAPSHOT
"Jt Arevalo   2017 10:40 AM   Office Visit   MRN: [id number]    Department:  [facility]   Dept Phone:  [phone]    Description:  Male : 1933   Provider:  Kathrine Melgoza M.D.           Reason for Visit     Results labs     Medication Management           Allergies as of 2017     No Known Allergies      You were diagnosed with     At risk for polypharmacy   [6392156]       Essential hypertension   [5002641]       Pure hypercholesterolemia   [272.0.ICD-9-CM]       History of cerebrovascular accident (CVA) with residual deficit   [3805740]       Coronary artery disease involving native coronary artery of native heart without angina pectoris   [3632850]       History of gout   [979625]         Vital Signs     Blood Pressure Pulse Temperature Respirations Height Weight    90/64 mmHg 56 36.4 °C (97.5 °F) 12 1.638 m (5' 4.49\") 63.5 kg (139 lb 15.9 oz)    Body Mass Index Oxygen Saturation Smoking Status             23.67 kg/m2 95% Former Smoker         Basic Information     Date Of Birth Sex Race Ethnicity Preferred Language    1933 Male Black or  Non- English      Your appointments     Oct 24, 2017 10:40 AM   Established Patient with Kathrine Melgoza M.D.   [facility] (--)    [address]  [address]  [facility] [address] [phone]           You will be receiving a confirmation call a few days before your appointment from our automated call confirmation system.              Problem List              ICD-10-CM Priority Class Noted - Resolved    History of prostate cancer Z85.46   2017 - Present    Essential hypertension I10   2017 - Present    History of gout Z87.39   2017 - Present    Coronary artery disease involving native coronary artery of native heart without angina pectoris I25.10   2017 - Present    Mild cognitive impairment G31.84   2017 - Present    History of cerebrovascular accident (CVA) with residual " deficit I69.30   4/17/2017 - Present    Pure hypercholesterolemia E78.00   5/16/2017 - Present      Health Maintenance        Date Due Completion Dates    IMM DTaP/Tdap/Td Vaccine (1 - Tdap) 12/14/1952 ---    IMM ZOSTER VACCINE 12/14/1993 ---    IMM PNEUMOCOCCAL 65+ (ADULT) LOW/MEDIUM RISK SERIES (1 of 2 - PCV13) 12/14/1998 ---    IMM INFLUENZA (1) 9/1/2017 ---            Current Immunizations     No immunizations on file.      Below and/or attached are the medications your provider expects you to take. Review all of your home medications and newly ordered medications with your provider and/or pharmacist. Follow medication instructions as directed by your provider and/or pharmacist. Please keep your medication list with you and share with your provider. Update the information when medications are discontinued, doses are changed, or new medications (including over-the-counter products) are added; and carry medication information at all times in the event of emergency situations     Allergies:  No Known Allergies          Medications  Valid as of: August 22, 2017 - 11:02 AM    Generic Name Brand Name Tablet Size Instructions for use    Aspirin (Tablet Delayed Response) ECOTRIN 81 MG Take 81 mg by mouth every day.        Atenolol (Tab) TENORMIN 50 MG Take 1 Tab by mouth every day.        Donepezil HCl (Tab) Donepezil HCl 23 MG Take 23 mg by mouth every day.        Latanoprost (Solution) XALATAN 0.005 % Place 0.005 Drops in both eyes every day.        .                 Medicines prescribed today were sent to:     [facility] [place] - [address]    [address] [place] [address]    Phone: [phone] Fax: [phone]    Open 24 Hours?: No      Medication refill instructions:       If your prescription bottle indicates you have medication refills left, it is not necessary to call your provider’s office. Please contact your pharmacy and they will refill your medication.    If your  prescription bottle indicates you do not have any refills left, you may request refills at any time through one of the following ways: The online [employer] system (except Urgent Care), by calling your provider’s office, or by asking your pharmacy to contact your provider’s office with a refill request. Medication refills are processed only during regular business hours and may not be available until the next business day. Your provider may request additional information or to have a follow-up visit with you prior to refilling your medication.   *Please Note: Medication refills are assigned a new Rx number when refilled electronically. Your pharmacy may indicate that no refills were authorized even though a new prescription for the same medication is available at the pharmacy. Please request the medicine by name with the pharmacy before contacting your provider for a refill.        Your To Do List     Future Labs/Procedures Complete By Expires    BASIC METABOLIC PANEL  As directed 8/23/2018    LIPID PROFILE  As directed 8/23/2018         [employer] Access Code: Activation code not generated  Current [employer] Status: Active

## 2017-08-23 NOTE — PROGRESS NOTES
"Subjective:   Jt Arevalo is a 83 y.o. male here today for medication review and lab results. He is accompanied by his caregivers, Boaz and Nohemi.    Patient reports that he is feeling \"well\" today. He and his caregivers are interested in cutting back further on his medications. Nohemi mentions that even with the punch pack, he sometimes forgets to take his medications or will take the wrong ones. At his last visit, we discontinued multiple medications including colchicine, indapamide, omeprazole and gabapentin. Jt tells me that he has been feeling better since stopping these. His caregivers have also noticed that he has more energy and that his mood is \"brighter.\"    Today, his blood pressure is on the low side at 90/64. This is lower than it's been at any other visit. He denies any symptoms, including lightheadedness, dizziness, chest pain or shortness of breath. He is on two blood pressure medications currently.    He has also taking a medicine for cholesterol as his cholesterol has been elevated in the past. He has a history of stroke and coronary artery disease. His caregivers are wondering if continuing this medication is necessary.    In regards to his gout, we had listed that he was still taking allopurinol. Patient reports that he is no longer taking it. He has not had any flareups since I last saw him.    Current medicines (including changes today)  Current Outpatient Prescriptions   Medication Sig Dispense Refill   • Donepezil HCl 23 MG Tab Take 23 mg by mouth every day. 90 Tab 3   • aspirin EC (ECOTRIN) 81 MG Tablet Delayed Response Take 81 mg by mouth every day.     • atenolol (TENORMIN) 50 MG Tab Take 1 Tab by mouth every day. 30 Tab 11   • latanoprost (XALATAN) 0.005 % Solution Place 0.005 Drops in both eyes every day.  1     No current facility-administered medications for this visit.     He  has a past medical history of Anxiety; Arthritis; Cancer (CMS-HCC); Cataract; Depression; Glaucoma; " "Hypertension; IBD (inflammatory bowel disease); Muscle disorder; and Stroke (CMS-HCC).    ROS   See HPI. No chest pain, no shortness of breath, no abdominal pain.     Objective:     Physical Exam:  Blood pressure 90/64, pulse 56, temperature 36.4 °C (97.5 °F), resp. rate 12, height 1.638 m (5' 4.49\"), weight 63.5 kg (139 lb 15.9 oz), SpO2 95 %. Body mass index is 23.67 kg/(m^2).   Constitutional: Alert, elderly male in no distress.  Skin: Warm, dry, good turgor, no rashes in visible areas.  Eye: Conjunctiva clear, lids normal.  ENMT: Hearing aide in right ear. Lips without lesions, good dentition, oropharynx clear.  Neck: Trachea midline, no masses, no thyromegaly.  Respiratory: Unlabored respiratory effort, lungs clear to auscultation, no wheezes, no ronchi.  Cardiovascular: Normal S1, S2, no murmur, no edema.  Abdomen: Soft, non-tender, no masses, no hepatosplenomegaly.  MSK: Steady gait with FWW. MARTÍNEZ with full ROM.  Psych: Normal affect and mood.    Assessment and Plan:     1. At risk for polypharmacy  Medications reviewed and non-essential medications discontinued. See below for individual assessments and plans.    2. Essential hypertension  Patient noted to be hypotensive today. His nifedipine was discontinued. We'll continue atenolol, but may be able to discontinue this at his next appointment. Lab work ordered.  - BASIC METABOLIC PANEL; Future    3. Pure hypercholesterolemia  4. History of cerebrovascular accident (CVA) with residual deficit  5. Coronary artery disease involving native coronary artery of native heart without angina pectoris  Together, patient, caregivers and I decided to discontinue statin medication given patient's age and risk for polypharmacy. We'll continue to monitor with labs. Plan will be to only restart statin medication if his cholesterol is significantly elevated.  - BASIC METABOLIC PANEL; Future  - LIPID PROFILE; Future    6. History of gout  Since discontinuation of allopurinol " and colchicine, patient has not had any flareups. We'll continue to monitor off medication.    Followup: Return in about 2 months (around 10/22/2017) for f/u HTN, med changes, short.         PLEASE NOTE: This dictation was created using voice recognition software. I have made every reasonable attempt to correct obvious errors, but I expect that there are errors of grammar and possibly content that I did not discover before finalizing the note.

## 2017-10-17 ENCOUNTER — HOSPITAL ENCOUNTER (OUTPATIENT)
Dept: LAB | Facility: MEDICAL CENTER | Age: 82
End: 2017-10-17
Attending: FAMILY MEDICINE
Payer: MEDICARE

## 2017-10-17 DIAGNOSIS — I69.30 HISTORY OF CEREBROVASCULAR ACCIDENT (CVA) WITH RESIDUAL DEFICIT: ICD-10-CM

## 2017-10-17 DIAGNOSIS — E78.00 PURE HYPERCHOLESTEROLEMIA: ICD-10-CM

## 2017-10-17 DIAGNOSIS — I10 ESSENTIAL HYPERTENSION: ICD-10-CM

## 2017-10-17 DIAGNOSIS — I25.10 CORONARY ARTERY DISEASE INVOLVING NATIVE CORONARY ARTERY OF NATIVE HEART WITHOUT ANGINA PECTORIS: ICD-10-CM

## 2017-10-17 LAB
ANION GAP SERPL CALC-SCNC: 10 MMOL/L (ref 0–11.9)
BUN SERPL-MCNC: 17 MG/DL (ref 8–22)
CALCIUM SERPL-MCNC: 9.7 MG/DL (ref 8.5–10.5)
CHLORIDE SERPL-SCNC: 104 MMOL/L (ref 96–112)
CHOLEST SERPL-MCNC: 255 MG/DL (ref 100–199)
CO2 SERPL-SCNC: 26 MMOL/L (ref 20–33)
CREAT SERPL-MCNC: 1.32 MG/DL (ref 0.5–1.4)
GFR SERPL CREATININE-BSD FRML MDRD: 52 ML/MIN/1.73 M 2
GLUCOSE SERPL-MCNC: 67 MG/DL (ref 65–99)
HDLC SERPL-MCNC: 53 MG/DL
LDLC SERPL CALC-MCNC: 175 MG/DL
POTASSIUM SERPL-SCNC: 4.4 MMOL/L (ref 3.6–5.5)
SODIUM SERPL-SCNC: 140 MMOL/L (ref 135–145)
TRIGL SERPL-MCNC: 135 MG/DL (ref 0–149)

## 2017-10-17 PROCEDURE — 80048 BASIC METABOLIC PNL TOTAL CA: CPT

## 2017-10-17 PROCEDURE — 36415 COLL VENOUS BLD VENIPUNCTURE: CPT

## 2017-10-17 PROCEDURE — 80061 LIPID PANEL: CPT

## 2017-10-25 ENCOUNTER — OFFICE VISIT (OUTPATIENT)
Dept: MEDICAL GROUP | Facility: PHYSICIAN GROUP | Age: 82
End: 2017-10-25
Payer: MEDICARE

## 2017-10-25 VITALS
BODY MASS INDEX: 22.99 KG/M2 | HEART RATE: 48 BPM | OXYGEN SATURATION: 98 % | HEIGHT: 65 IN | RESPIRATION RATE: 16 BRPM | DIASTOLIC BLOOD PRESSURE: 70 MMHG | WEIGHT: 138 LBS | SYSTOLIC BLOOD PRESSURE: 152 MMHG | TEMPERATURE: 97.2 F

## 2017-10-25 DIAGNOSIS — Z23 NEED FOR VACCINATION: ICD-10-CM

## 2017-10-25 DIAGNOSIS — J34.89 RHINORRHEA: ICD-10-CM

## 2017-10-25 DIAGNOSIS — I10 ESSENTIAL HYPERTENSION: ICD-10-CM

## 2017-10-25 DIAGNOSIS — G31.84 MILD COGNITIVE IMPAIRMENT: ICD-10-CM

## 2017-10-25 DIAGNOSIS — E78.00 PURE HYPERCHOLESTEROLEMIA: ICD-10-CM

## 2017-10-25 PROCEDURE — 90662 IIV NO PRSV INCREASED AG IM: CPT | Performed by: FAMILY MEDICINE

## 2017-10-25 PROCEDURE — G0008 ADMIN INFLUENZA VIRUS VAC: HCPCS | Performed by: FAMILY MEDICINE

## 2017-10-25 PROCEDURE — 99214 OFFICE O/P EST MOD 30 MIN: CPT | Mod: 25 | Performed by: FAMILY MEDICINE

## 2017-10-25 RX ORDER — AZELASTINE 1 MG/ML
1 SPRAY, METERED NASAL
Qty: 1 BOTTLE | Refills: 11 | Status: SHIPPED | OUTPATIENT
Start: 2017-10-25 | End: 2018-04-04

## 2017-10-25 ASSESSMENT — PAIN SCALES - GENERAL: PAINLEVEL: NO PAIN

## 2017-10-25 NOTE — ASSESSMENT & PLAN NOTE
Stable. He still spends most of his day lying in bed and watching TV. He is at an assisted facility for dementia. His family reports he has had more energy lately as he is not taking as many medications.

## 2017-10-25 NOTE — ASSESSMENT & PLAN NOTE
Stable blood pressure. He is currently only on atenolol 50mg daily. His heart rate is quite low at 48. Denies adverse effects.

## 2017-10-25 NOTE — ASSESSMENT & PLAN NOTE
Stable. Reviewed recent lipid panel with patient and family members.    Results for HECTOR FUENTES (MRN [id number]) as of 10/25/2017 14:02   Ref. Range 4/17/2017 10:39 8/15/2017 09:45 10/17/2017 10:25   Cholesterol,Tot Latest Ref Range: 100 - 199 mg/dL 245 (H)  255 (H)   Triglycerides Latest Ref Range: 0 - 149 mg/dL 252 (H)  135   HDL Latest Ref Range: >=40 mg/dL 50  53   LDL Latest Ref Range: <100 mg/dL 145 (H)  175 (H)

## 2017-10-25 NOTE — ASSESSMENT & PLAN NOTE
This is a new problem to me today. Patient has had clear nasal drainage, especially when he eats, for several years. He has not tried any nose sprays or medications yet.

## 2017-10-25 NOTE — PROGRESS NOTES
Subjective:   Jt Arevalo is a 83 y.o. male here today for follow-up hypertension and hypercholesterolemia. He is accompanied by his nephew (Boaz) and his niece-in-law (Nohemi).    Essential hypertension  Stable blood pressure. He is currently only on atenolol 50mg daily. His heart rate is quite low at 48. Denies adverse effects.    Pure hypercholesterolemia  Stable. Reviewed recent lipid panel with patient and family members.    Results for JT AREVALO (MRN [id number]) as of 10/25/2017 14:02   Ref. Range 4/17/2017 10:39 8/15/2017 09:45 10/17/2017 10:25   Cholesterol,Tot Latest Ref Range: 100 - 199 mg/dL 245 (H)  255 (H)   Triglycerides Latest Ref Range: 0 - 149 mg/dL 252 (H)  135   HDL Latest Ref Range: >=40 mg/dL 50  53   LDL Latest Ref Range: <100 mg/dL 145 (H)  175 (H)     Mild cognitive impairment  Stable. He still spends most of his day lying in bed and watching TV. He is at an assisted facility for dementia. His family reports he has had more energy lately as he is not taking as many medications.    Rhinorrhea  This is a new problem to me today. Patient has had clear nasal drainage, especially when he eats, for several years. He has not tried any nose sprays or medications yet.     Current medicines (including changes today)  Current Outpatient Prescriptions   Medication Sig Dispense Refill   • azelastine (ASTELIN) 137 MCG/SPRAY nasal spray [place] 1 Spray in nose 3 times a day before meals. 1 Bottle 11   • Donepezil HCl 23 MG Tab Take 23 mg by mouth every day. 90 Tab 3   • aspirin EC (ECOTRIN) 81 MG Tablet Delayed Response Take 81 mg by mouth every day.     • latanoprost (XALATAN) 0.005 % Solution Place 0.005 Drops in both eyes every day.  1     No current facility-administered medications for this visit.      He  has a past medical history of Anxiety; Arthritis; Cancer (CMS-HCC); Cataract; Depression; Glaucoma; Hypertension; IBD (inflammatory bowel disease); Muscle disorder; and Stroke ([facility]    ROS  "  See HPI. No chest pain, no shortness of breath, no abdominal pain.     Objective:     Physical Exam:  Blood pressure 152/70, pulse (!) 48, temperature 36.2 °C (97.2 °F), resp. rate 16, height 1.638 m (5' 4.5\"), weight 62.6 kg (138 lb), SpO2 98 %. Body mass index is 23.32 kg/m².   Constitutional: Alert, elderly male in no distress.  Skin: Warm, dry, good turgor, no rashes in visible areas.  Eye: Conjunctiva clear, lids normal.  ENMT: Hearing aide in right ear. Lips without lesions, good dentition, oropharynx clear.  Neck: Trachea midline, no masses, no thyromegaly.  Respiratory: Unlabored respiratory effort, lungs clear to auscultation, no wheezes, no ronchi.  Cardiovascular: Normal S1, S2, no murmur, no edema.  MSK: Steady gait with FWW. MARTÍNEZ with full ROM.  Psych: Normal affect and mood.    Assessment and Plan:     1. Essential hypertension  Bradycardic today. Discontinue atenolol. Monitor blood pressure off medications.  - BASIC METABOLIC PANEL; Future    2. Pure hypercholesterolemia  Stable. There was a bump in his LDL, but together we agreed to monitor off medication. Recheck lipid panel in 6 months.  - LIPID PROFILE; Future    3. Mild cognitive impairment  Chronic and stable. Patient is in an assisted living facility. Continue donepezil.    4. Rhinorrhea  This is a new problem. Trial of azelastine spray. Monitor.  - azelastine (ASTELIN) 137 MCG/SPRAY nasal spray; [place] 1 Spray in nose 3 times a day before meals.  Dispense: 1 Bottle; Refill: 11    5. Need for vaccination  Influenza vaccine discussed and administered in office today.  - INFLUENZA VACCINE, HIGH DOSE (65+ ONLY)    Followup: Return in about 6 months (around 4/25/2018) for f/u BP and lab results, short.         PLEASE NOTE: This dictation was created using voice recognition software. I have made every reasonable attempt to correct obvious errors, but I expect that there are errors of grammar and possibly content that I did not discover before " finalizing the note.

## 2018-03-29 ENCOUNTER — HOSPITAL ENCOUNTER (OUTPATIENT)
Dept: LAB | Facility: MEDICAL CENTER | Age: 83
End: 2018-03-29
Attending: FAMILY MEDICINE
Payer: MEDICARE

## 2018-03-29 DIAGNOSIS — I10 ESSENTIAL HYPERTENSION: ICD-10-CM

## 2018-03-29 DIAGNOSIS — E78.00 PURE HYPERCHOLESTEROLEMIA: ICD-10-CM

## 2018-03-29 LAB
ANION GAP SERPL CALC-SCNC: 8 MMOL/L (ref 0–11.9)
BUN SERPL-MCNC: 19 MG/DL (ref 8–22)
CALCIUM SERPL-MCNC: 9.8 MG/DL (ref 8.5–10.5)
CHLORIDE SERPL-SCNC: 107 MMOL/L (ref 96–112)
CHOLEST SERPL-MCNC: 257 MG/DL (ref 100–199)
CO2 SERPL-SCNC: 26 MMOL/L (ref 20–33)
CREAT SERPL-MCNC: 1.29 MG/DL (ref 0.5–1.4)
GLUCOSE SERPL-MCNC: 73 MG/DL (ref 65–99)
HDLC SERPL-MCNC: 53 MG/DL
LDLC SERPL CALC-MCNC: 172 MG/DL
POTASSIUM SERPL-SCNC: 4.7 MMOL/L (ref 3.6–5.5)
SODIUM SERPL-SCNC: 141 MMOL/L (ref 135–145)
TRIGL SERPL-MCNC: 160 MG/DL (ref 0–149)

## 2018-03-29 PROCEDURE — 80061 LIPID PANEL: CPT

## 2018-03-29 PROCEDURE — 80048 BASIC METABOLIC PNL TOTAL CA: CPT

## 2018-03-29 PROCEDURE — 36415 COLL VENOUS BLD VENIPUNCTURE: CPT

## 2018-04-04 ENCOUNTER — OFFICE VISIT (OUTPATIENT)
Dept: MEDICAL GROUP | Facility: PHYSICIAN GROUP | Age: 83
End: 2018-04-04
Payer: MEDICARE

## 2018-04-04 VITALS
TEMPERATURE: 98 F | BODY MASS INDEX: 23.16 KG/M2 | SYSTOLIC BLOOD PRESSURE: 140 MMHG | WEIGHT: 139 LBS | HEART RATE: 86 BPM | DIASTOLIC BLOOD PRESSURE: 72 MMHG | OXYGEN SATURATION: 100 % | HEIGHT: 65 IN | RESPIRATION RATE: 18 BRPM

## 2018-04-04 DIAGNOSIS — I10 ESSENTIAL HYPERTENSION: ICD-10-CM

## 2018-04-04 DIAGNOSIS — I69.30 HISTORY OF CEREBROVASCULAR ACCIDENT (CVA) WITH RESIDUAL DEFICIT: ICD-10-CM

## 2018-04-04 DIAGNOSIS — H40.89 OTHER GLAUCOMA OF LEFT EYE: ICD-10-CM

## 2018-04-04 DIAGNOSIS — E78.00 PURE HYPERCHOLESTEROLEMIA: ICD-10-CM

## 2018-04-04 DIAGNOSIS — F02.80 LATE ONSET ALZHEIMER'S DISEASE WITHOUT BEHAVIORAL DISTURBANCE (HCC): ICD-10-CM

## 2018-04-04 DIAGNOSIS — G30.1 LATE ONSET ALZHEIMER'S DISEASE WITHOUT BEHAVIORAL DISTURBANCE (HCC): ICD-10-CM

## 2018-04-04 DIAGNOSIS — I25.10 CORONARY ARTERY DISEASE INVOLVING NATIVE CORONARY ARTERY OF NATIVE HEART WITHOUT ANGINA PECTORIS: ICD-10-CM

## 2018-04-04 DIAGNOSIS — J34.89 RHINORRHEA: ICD-10-CM

## 2018-04-04 PROCEDURE — 99214 OFFICE O/P EST MOD 30 MIN: CPT | Performed by: FAMILY MEDICINE

## 2018-04-04 ASSESSMENT — PAIN SCALES - GENERAL: PAINLEVEL: NO PAIN

## 2018-04-04 ASSESSMENT — PATIENT HEALTH QUESTIONNAIRE - PHQ9: CLINICAL INTERPRETATION OF PHQ2 SCORE: 0

## 2018-04-04 NOTE — PROGRESS NOTES
Subjective:   Jt Arevalo is a 84 y.o. male here today for follow-up memory loss, hypertension and hypercholesterolemia.    Late onset Alzheimer's disease without behavioral disturbance  Patient's family tells me that his memory is worsening. They brought in his pill packs that show that he has not been taking his medications. Fortunately, he was only on donepezil and atenolol. The donepezil has not been helping with his memory. His blood pressure is also very stable. He is currently living in independent living. He does have help with cooking meals and cleaning his house. He also has a home health aide who helps him bathe twice a week. There is no nursing assistance available at his current home. He still spends most of his day lying in bed and watching TV. His family members mentioned that they will be out of the country for a few weeks going on vacation.    Essential hypertension  Stable blood pressure. He is not currently on any medications. Denies lightheadedness, vision changes, headache, palpitations or leg swelling.    Pure hypercholesterolemia  Stable. History of stroke and coronary artery disease. Reviewed recent lipid panel with patient and family members. We discontinued his statin medication several months ago as he is developing memory issues and has a hard time remembering to take it. He continues to take a baby aspirin a day.    Results for JT AREVALO (MRN [id number]) as of 4/4/2018 07:32   Ref. Range 3/29/2018 10:58   Cholesterol,Tot Latest Ref Range: 100 - 199 mg/dL 257 (H)   Triglycerides Latest Ref Range: 0 - 149 mg/dL 160 (H)   HDL Latest Ref Range: >=40 mg/dL 53   LDL Latest Ref Range: <100 mg/dL 172 (H)     Rhinorrhea  No improvement with nasal spary. Patient has had clear nasal drainage, especially when he eats, for several years. He tells me that he has been able to deal with it and does not want to try any new medication or sprays.     Glaucoma  Patient has a history of glaucoma in his  "left eye. He is using drops, but has noticed that his vision is blurry or lately. He has not seen an eye doctor since moving to [place]. Denies any eye pain or eye redness.    Current medicines (including changes today)  Current Outpatient Prescriptions   Medication Sig Dispense Refill   • aspirin EC (ECOTRIN) 81 MG Tablet Delayed Response Take 81 mg by mouth every day.     • latanoprost (XALATAN) 0.005 % Solution Place 0.005 Drops in both eyes every day.  1     No current facility-administered medications for this visit.      He  has a past medical history of Anxiety; Arthritis; Cancer (CMS-HCC); Cataract; Depression; Glaucoma; Hypertension; IBD (inflammatory bowel disease); Muscle disorder; and Stroke (CMS-HCC).    ROS  No chest pain, no shortness of breath, no abdominal pain.     Objective:     Physical Exam:  Blood pressure 140/72, pulse 86, temperature 36.7 °C (98 °F), resp. rate 18, height 1.638 m (5' 4.5\"), weight 63 kg (139 lb), SpO2 100 %. Body mass index is 23.49 kg/m².   Constitutional: Alert, elderly male in no distress.  Skin: Warm, dry, good turgor, no rashes in visible areas.  Eye: +Glasses. Equal, round and reactive, conjunctiva clear, lids normal.  ENMT: Lips without lesions, good dentition, oropharynx clear.  Neck: Trachea midline, no masses, no thyromegaly.  Respiratory: Unlabored respiratory effort, no cough.  Neuro: Confused. Grossly non-focal. No cranial nerve deficit. Strength and sensation intact.  Psych: Alert and oriented x3, normal affect and mood.    Assessment and Plan:     1. Late onset Alzheimer's disease without behavioral disturbance  Patient's memory has continued to decline despite trial of medication. Work-up in he past was unremarkable. I had a lengthy discussion with the patient and his family. I advised them that transition to an assisted living facility or memory care center was indicated at this time. They are in agreement and request assistance with finding a facility. His " family will be out of the country on vacation later this month. I will submit a referral to social work for assistance with a temporary respite home as well as a more permanent place of residence. Discontinued donepezil.  - REFERRAL TO COMPLEX CARE MANAGEMENT Services Requested:     2. Essential hypertension  Stable off medication. Continue to monitor during appointments.    3. Pure hypercholesterolemia  4. History of cerebrovascular accident (CVA) with residual deficit  5. Coronary artery disease involving native coronary artery of native heart without angina pectoris  Stable. Patient is certainly at higher risk, but due to his dementia, it would be more harmful to him to continue statin therapy. Continue to monitor. Continue low dose aspirin.    6. Rhinorrhea  Chronic and stable. No improvement with nasal spray. Patient does not want to try another medication at this time.    7. Other glaucoma of left eye  Per patient history. He is also having new blurry vision. Referred to ophthalmology.  - REFERRAL TO OPHTHALMOLOGY    Followup: Return in about 4 months (around 8/4/2018) for f/u memory, short.         PLEASE NOTE: This dictation was created using voice recognition software. I have made every reasonable attempt to correct obvious errors, but I expect that there are errors of grammar and possibly content that I did not discover before finalizing the note.

## 2018-04-04 NOTE — ASSESSMENT & PLAN NOTE
Stable blood pressure. He is not currently on any medications. Denies lightheadedness, vision changes, headache, palpitations or leg swelling.

## 2018-04-04 NOTE — ASSESSMENT & PLAN NOTE
Stable. History of stroke and coronary artery disease. Reviewed recent lipid panel with patient and family members. We discontinued his statin medication several months ago as he is developing memory issues and has a hard time remembering to take it. He continues to take a baby aspirin a day.    Results for HECTOR FUENTES (MRN [id number]) as of 4/4/2018 07:32   Ref. Range 3/29/2018 10:58   Cholesterol,Tot Latest Ref Range: 100 - 199 mg/dL 257 (H)   Triglycerides Latest Ref Range: 0 - 149 mg/dL 160 (H)   HDL Latest Ref Range: >=40 mg/dL 53   LDL Latest Ref Range: <100 mg/dL 172 (H)

## 2018-04-05 ENCOUNTER — PATIENT OUTREACH (OUTPATIENT)
Dept: HEALTH INFORMATION MANAGEMENT | Facility: OTHER | Age: 83
End: 2018-04-05

## 2018-04-05 NOTE — PROGRESS NOTES
Pt referred to  by PCP with report that family/pt's caregiver's will be going on vacation and discussed pt needing possible respite and care services while they are gone due to dx of Alzheimer's disease. Due to pt's dx outreach call was made to family member to go over resources for respite care. LSW introduced self to pt's niece/care giver, discussed role of SW care coordination and pt's/family's identified needs.     May explained pt currently lives at [facility] [place] there is a caregiver, Betsy, who comes in a few times a week to provide pt with a shower. Discussed potential options available while she is out of the country, which she reported her and her  will be leaving next Thursday and would be back mid May. Unfortunately family would not qualify for respite candis program due to they do not physically live with pt. Discussed options to put in respite care services would be private pay. Discussed possible respite stay in a memory care, group home, or assisted living facility with more supervision while they are gone. Also discussed having care-giver come into pt's current home more frequently during this time. Pt's niece indicated they will likely have pt's current care-giver, Betsy come over more frequently to check on him while they are gone. Also encouraged them to leave there contact information with the Independent Living Facility should something happen and they need to get a hold of her and possibly a good local contact willing to assist if needed. She voiced understanding.     May also indicated when they get back they would like to start looking into more long-term placement options such as a memory care facility. Discussed that these facilities would be private pay unless pt has a long-term care policy, in which they would need to call the policy to determine if there is coverage for memory care placement. Discussed if may would like LSW to mail her  out a resource list on facilities in the community as well as local programs/resources in the community such as the Alzheimer's Association. Niece agreeable to receive information.     Discussed having niece follow back up with LSW when they return from their vacation to move forward with potential options for higher level of care for pt, if desired. Niece voiced agreement.     Plan:  · Following resources mailed to nidarya:  · [facility] Caregiver Guidebook  · Resource list for Memory care and Assisted Living Facilities  · Information on services/programs with the Alzheimer's Association.   · LSW will not actively follow pt at this time, niece is to call LSW back upon their return if assistance is needed with higher level of care options for pt.

## 2018-07-11 ENCOUNTER — NON-PROVIDER VISIT (OUTPATIENT)
Dept: MEDICAL GROUP | Facility: PHYSICIAN GROUP | Age: 83
End: 2018-07-11
Payer: MEDICARE

## 2018-07-11 DIAGNOSIS — Z11.1 PPD SCREENING TEST: ICD-10-CM

## 2018-07-11 DIAGNOSIS — Z23 NEED FOR VACCINATION: ICD-10-CM

## 2018-07-11 PROCEDURE — 86580 TB INTRADERMAL TEST: CPT | Performed by: FAMILY MEDICINE

## 2018-07-11 RX ORDER — LATANOPROST 50 UG/ML
1 SOLUTION/ DROPS OPHTHALMIC DAILY
Qty: 1 BOTTLE | Refills: 11 | Status: SHIPPED | OUTPATIENT
Start: 2018-07-11 | End: 2019-01-01 | Stop reason: SDUPTHER

## 2018-07-11 NOTE — PROGRESS NOTES
Jt Arevalo is a 84 y.o. male here for a non-provider visit for PPD placement -- Step 1 of 1    Reason for PPD:  group home requirement and nursing home requirement    1. TB evaluation questionnaire completed by patient? Yes      -  If any answers marked yes did you contact a provider prior to placing? No  2.  Patient notified to return to clinic for reading on: 07/13/18  3.  PPD Placement documentation completed on TB evaluation questionnaire? Yes  4.  Location of TB evaluation questionnaire filed: [place]

## 2018-07-11 NOTE — PROGRESS NOTES
Pt is on schedule for a TB skin test placement please sign pended order. Order pended to Dr Melgoza

## 2018-07-13 ENCOUNTER — NON-PROVIDER VISIT (OUTPATIENT)
Dept: MEDICAL GROUP | Facility: PHYSICIAN GROUP | Age: 83
End: 2018-07-13
Payer: MEDICARE

## 2018-07-13 LAB — TB WHEAL 3D P 5 TU DIAM: NORMAL MM

## 2018-07-13 NOTE — PROGRESS NOTES
Jt Arevalo is a 84 y.o. male here for a non-provider visit for PPD reading -- Step 1 of 1.      1.  Resulted in Epic under enter/edit results? Yes   2.  TB evaluation questionnaire scanned into chart and original given to patient?Yes      3. Was induration greater than 0 mm? No.    If Step 1 of 2, when is patient returning for second step (delete if N/A): 0    Routed to PCP? Yes

## 2018-07-24 ENCOUNTER — TELEPHONE (OUTPATIENT)
Dept: MEDICAL GROUP | Facility: PHYSICIAN GROUP | Age: 83
End: 2018-07-24

## 2018-07-24 NOTE — TELEPHONE ENCOUNTER
1. Caller Name: Brandi Beasley [facility] [place]                                        Call Back Number: [phone]      Patient approves a detailed voicemail message: yes    Patient is going to reside at [facility] and we completed the forms for him to reside there however it was noted that patient may administer his own medication with staff supervision ... If this is the case [facility] charges patient and extra $550 per month.  Brandi is questioning if staff supervision is actually necessary as he is only taking aspirin and eye drops?  Brnadi does not want patient to have these additional costs per month if truly not necessary.  If ok to have patient administer medications by himself they would like corrected form faxed back with update, provider initials and date.  Aware that if PCP does not agree with this our office will call their facility back.   , I have re-printed this form (in your file) to fax is ok to have patient administer medications.  If you do not agree with this please let me know.  Thank you.

## 2018-07-30 ENCOUNTER — TELEPHONE (OUTPATIENT)
Dept: MEDICAL GROUP | Facility: PHYSICIAN GROUP | Age: 83
End: 2018-07-30

## 2018-07-30 NOTE — TELEPHONE ENCOUNTER
Nohemi informed patient does not have a pending lab order to do labs prior to appointment.  She did not think so however was just checking.

## 2018-07-30 NOTE — TELEPHONE ENCOUNTER
VOICEMAIL  1. Caller Name: Nohemi Fernandes                    Call Back Number: [phone]    2. Message: Nohemi questioned if patient has to do labs prior to appointment on 8/8/18.    3. Patient approves office to leave a detailed voicemail/MyChart message: yes

## 2018-08-08 ENCOUNTER — OFFICE VISIT (OUTPATIENT)
Dept: MEDICAL GROUP | Facility: PHYSICIAN GROUP | Age: 83
End: 2018-08-08
Payer: MEDICARE

## 2018-08-08 VITALS
BODY MASS INDEX: 23.53 KG/M2 | DIASTOLIC BLOOD PRESSURE: 60 MMHG | HEART RATE: 80 BPM | SYSTOLIC BLOOD PRESSURE: 100 MMHG | RESPIRATION RATE: 20 BRPM | OXYGEN SATURATION: 96 % | TEMPERATURE: 98.4 F | WEIGHT: 141.2 LBS | HEIGHT: 65 IN

## 2018-08-08 DIAGNOSIS — I25.10 CORONARY ARTERY DISEASE INVOLVING NATIVE CORONARY ARTERY OF NATIVE HEART WITHOUT ANGINA PECTORIS: ICD-10-CM

## 2018-08-08 DIAGNOSIS — I10 ESSENTIAL HYPERTENSION: ICD-10-CM

## 2018-08-08 DIAGNOSIS — Z78.9 PHYSICIAN ORDERS FOR LIFE-SUSTAINING TREATMENT (POLST) FORM INDICATES PATIENT WISH FOR FULL CODE RESUSCITATION STATUS: ICD-10-CM

## 2018-08-08 DIAGNOSIS — H54.62 VISION LOSS OF LEFT EYE: ICD-10-CM

## 2018-08-08 DIAGNOSIS — Z71.89 ADVANCED CARE PLANNING/COUNSELING DISCUSSION: ICD-10-CM

## 2018-08-08 DIAGNOSIS — H40.89 OTHER GLAUCOMA OF BOTH EYES: ICD-10-CM

## 2018-08-08 DIAGNOSIS — E78.00 PURE HYPERCHOLESTEROLEMIA: ICD-10-CM

## 2018-08-08 DIAGNOSIS — G30.1 LATE ONSET ALZHEIMER'S DISEASE WITHOUT BEHAVIORAL DISTURBANCE (HCC): ICD-10-CM

## 2018-08-08 DIAGNOSIS — I69.30 HISTORY OF CEREBROVASCULAR ACCIDENT (CVA) WITH RESIDUAL DEFICIT: ICD-10-CM

## 2018-08-08 DIAGNOSIS — F02.80 LATE ONSET ALZHEIMER'S DISEASE WITHOUT BEHAVIORAL DISTURBANCE (HCC): ICD-10-CM

## 2018-08-08 PROBLEM — J34.89 RHINORRHEA: Status: RESOLVED | Noted: 2017-10-25 | Resolved: 2018-08-08

## 2018-08-08 PROCEDURE — 99497 ADVNCD CARE PLAN 30 MIN: CPT | Performed by: FAMILY MEDICINE

## 2018-08-08 PROCEDURE — 99213 OFFICE O/P EST LOW 20 MIN: CPT | Mod: 25 | Performed by: FAMILY MEDICINE

## 2018-08-08 RX ORDER — BRINZOLAMIDE/BRIMONIDINE TARTRATE 10; 2 MG/ML; MG/ML
1 SUSPENSION/ DROPS OPHTHALMIC 3 TIMES DAILY
Refills: 3 | COMMUNITY
Start: 2018-07-19 | End: 2020-01-01

## 2018-08-08 ASSESSMENT — PAIN SCALES - GENERAL: PAINLEVEL: NO PAIN

## 2018-08-08 NOTE — ACP (ADVANCE CARE PLANNING)
Advance Care Planning Note    Discussion date:  8/8/2018  Discussion participants:  Patient and Durable Power of  (Boaz)    The patient wishes to discuss Advanced Care Planning today and the following is a brief summary of our discussion.    Patient has capacity to make his own medical decisons    Advance Directives documents on file:  Durable Power of  for Healthcare    Communication of relevant diagnoses:   Alzheimer's Disease, Essential Hypertension, History of CVA, Coronary Artery Disease, Hypercholesterolemia, History of Prostate Cancer    Communication of prognosis: Fair    Communication of treatment goals/options: Continue current level of care and completion of POLST today    Treatment decisions: Per scanned POLST    Code status: Attempt full resuscitation/full treatment     Time statement:  Patient was seen for 30 minutes face to face to discuss advanced care planning as well as completing his POLST form.    Kathrine Melgoza M.D.

## 2018-08-08 NOTE — PROGRESS NOTES
"Subjective:   Jt Arevalo is a 84 y.o. male here today for follow-up glaucoma, hypertension and advanced care planning.  He is accompanied by his nephew and DPOABoaz.    Since I last saw Jt, he has transitioned over to an assisted care facility.  He and his nephew tell me that he is doing very well over there.  He has help with getting ready in the morning and they are making all of his meals.  He did have an episode of diarrhea last week, but the patient thinks that this may have been due to the food being \"too rich.\"  Boaz mentions that Jt glaucoma is getting worse.  He recently developed some vision loss in his left eye.  His pressures have been increasing.  He is on a new eyedrop with the hopes that this will decrease the pressure.  If this is not effective, he may need surgery.    Jt and Boaz would also like to discuss advanced care planning today.  Jt has an advanced directive on file, but he does not have a POLST.  See additional notes for details regarding our discussion.    In regards to his chronic medical conditions, these are stable.  His blood pressure is in normal range without medication.  At previous visits, we had discontinued most of his medications as the patient and his caregivers desire to take more of a palliative approach.    Current medicines (including changes today)  Current Outpatient Prescriptions   Medication Sig Dispense Refill   • SIMBRINZA 1-0.2 % [place] 1 Drop by Ophthalmic route 3 times a day.  3   • latanoprost (XALATAN) 0.005 % Solution Place 1 Drop in both eyes every day. 1 Bottle 11   • aspirin EC (ECOTRIN) 81 MG Tablet Delayed Response Take 81 mg by mouth every day.       No current facility-administered medications for this visit.      He  has a past medical history of Anxiety; Arthritis; Cancer (HCC); Cataract; Depression; Glaucoma; Hypertension; IBD (inflammatory bowel disease); Muscle disorder; and Stroke ([facility]    ROS   No chest pain, no " "shortness of breath, no abdominal pain.     Objective:     Physical Exam:  Blood pressure 100/60, pulse 80, temperature 36.9 °C (98.4 °F), resp. rate 20, height 1.638 m (5' 4.5\"), weight 64 kg (141 lb 3.2 oz), SpO2 96 %. Body mass index is 23.86 kg/m².   Constitutional: Alert, no distress, non-toxic apperance.  Skin: Warm, dry, good turgor, no rashes in visible areas.  Eye: +Glasses. Equal, round and reactive, conjunctiva clear, lids normal.  ENMT: Lips without lesions, good dentition, oropharynx clear.  Neck: Trachea midline, no masses, no thyromegaly. No cervical or supraclavicular lymphadenopathy.  Respiratory: Unlabored respiratory effort, lungs clear to auscultation, no wheezes, no rhonchi.  Cardiovascular: Normal S1, S2, no murmur, no edema.  Abdomen: Soft, non-tender, no masses, no hepatosplenomegaly.  MSK: Slow and steady gait with rollator.  Psych: Alert and oriented x3, normal affect and mood.    Assessment and Plan:     1. Vision loss of left eye  2. Other glaucoma of both eyes  Worsening per patient report.  He is following closely with an ophthalmologist and is under new treatment.  We will continue to monitor.      3. Essential hypertension  Blood pressure well controlled without medications.  Lab work has been ordered to follow-up on kidney function.  We will continue to monitor.  - BASIC METABOLIC PANEL; Future    4. Advanced care planning/counseling discussion  5. Physician orders for life-sustaining treatment (POLST) form indicates patient wish for full code resuscitation status  See additional notes.  POLST completed today.    6. Late onset Alzheimer's disease without behavioral disturbance  Chronic and stable.  Patient is in an assisted-living facility.    7. Pure hypercholesterolemia  8. History of cerebrovascular accident (CVA) with residual deficit  9. Coronary artery disease involving native coronary artery of native heart without angina pectoris  Chronic and stable.  Patient continues to " take a baby aspirin.  Other prescription medications were discontinued as we agreed to take a palliative approach.    Followup: Return in about 6 months (around 2/8/2019) for routine follow-up, short.         PLEASE NOTE: This dictation was created using voice recognition software. I have made every reasonable attempt to correct obvious errors, but I expect that there are errors of grammar and possibly content that I did not discover before finalizing the note.

## 2018-08-08 NOTE — LETTER
[facility]  Kathrine Melgoza M.D.  [address]  [place] [address]  Fax: [phone]   Authorization for Release/Disclosure of   Protected Health Information   Name: HECTOR AREVALO : 1933 SSN: [id number]   Address: [address] [place] [address] [place] [address] Phone:    [phone] (home)    I authorize the entity listed below to release/disclose the PHI below to:   [facility]/Kathrine Melgoza M.D. and Kathrine Melgoza M.D.   Provider or Entity Name:  Dr. Cabrera   [place], [place] Phone:      Fax:  [phone]   Reason for request: continuity of care   Information to be released:    [  ] LAST COLONOSCOPY,  including any PATH REPORT and follow-up  [  ] LAST FIT/COLOGUARD RESULT [  ] LAST DEXA  [  ] LAST MAMMOGRAM  [  ] LAST PAP  [  ] LAST LABS [  ] RETINA EXAM REPORT  [ x ] IMMUNIZATION RECORDS  [  ] Release all info      [  ] Check here and initial the line next to each item to release ALL health information INCLUDING  _____ Care and treatment for drug and / or alcohol abuse  _____ HIV testing, infection status, or AIDS  _____ Genetic Testing    DATES OF SERVICE OR TIME PERIOD TO BE DISCLOSED: _____________  I understand and acknowledge that:  * This Authorization may be revoked at any time by you in writing, except if your health information has already been used or disclosed.  * Your health information that will be used or disclosed as a result of you signing this authorization could be re-disclosed by the recipient. If this occurs, your re-disclosed health information may no longer be protected by State or Federal laws.  * You may refuse to sign this Authorization. Your refusal will not affect your ability to obtain treatment.  * This Authorization becomes effective upon signing and will  on (date) __________.      If no date is indicated, this Authorization will  one (1) year from the signature date.    Name: Hector Arevalo    Signature:   Date:     2018            PLEASE FAX REQUESTED RECORDS BACK TO: [phone]

## 2018-10-10 NOTE — TELEPHONE ENCOUNTER
, I spoke with [facility] and cancelled the indapamide, colchicine, gabapentin, & omeprazole per your request.  They are aware we sent in a new rx for atorvastatin.  Pharmacy Tech states they faxed request for allopurinol and amlodipine however I do not see request. I have attached Rxs to send.    0 = swallows foods/liquids without difficulty

## 2018-11-07 ENCOUNTER — TELEPHONE (OUTPATIENT)
Dept: MEDICAL GROUP | Facility: PHYSICIAN GROUP | Age: 83
End: 2018-11-07

## 2018-11-07 DIAGNOSIS — F02.80 LATE ONSET ALZHEIMER'S DISEASE WITHOUT BEHAVIORAL DISTURBANCE (HCC): ICD-10-CM

## 2018-11-07 DIAGNOSIS — R41.82 ALTERED MENTAL STATUS, UNSPECIFIED ALTERED MENTAL STATUS TYPE: ICD-10-CM

## 2018-11-07 DIAGNOSIS — G30.1 LATE ONSET ALZHEIMER'S DISEASE WITHOUT BEHAVIORAL DISTURBANCE (HCC): ICD-10-CM

## 2018-11-07 NOTE — TELEPHONE ENCOUNTER
VOICEMAIL  1. Caller Name: Jt Arevalo                        Call Back Number: [phone] (home)       2. Message: Paramjit nurse from [facility] states pt had increased confusion and they would like a UA order.    3. Patient approves office to leave a detailed voicemail/MyChart message: yes    [phone]  Fax [phone]

## 2018-11-08 ENCOUNTER — HOSPITAL ENCOUNTER (OUTPATIENT)
Facility: MEDICAL CENTER | Age: 83
End: 2018-11-08
Attending: FAMILY MEDICINE
Payer: MEDICARE

## 2018-11-08 PROCEDURE — 81001 URINALYSIS AUTO W/SCOPE: CPT

## 2018-11-09 DIAGNOSIS — G30.1 LATE ONSET ALZHEIMER'S DISEASE WITHOUT BEHAVIORAL DISTURBANCE (HCC): ICD-10-CM

## 2018-11-09 DIAGNOSIS — F02.80 LATE ONSET ALZHEIMER'S DISEASE WITHOUT BEHAVIORAL DISTURBANCE (HCC): ICD-10-CM

## 2018-11-09 DIAGNOSIS — R41.82 ALTERED MENTAL STATUS, UNSPECIFIED ALTERED MENTAL STATUS TYPE: ICD-10-CM

## 2018-11-09 LAB
APPEARANCE UR: CLEAR
BACTERIA #/AREA URNS HPF: NEGATIVE /HPF
BILIRUB UR QL STRIP.AUTO: NEGATIVE
COLOR UR: YELLOW
EPI CELLS #/AREA URNS HPF: NEGATIVE /HPF
GLUCOSE UR STRIP.AUTO-MCNC: NEGATIVE MG/DL
HYALINE CASTS #/AREA URNS LPF: ABNORMAL /LPF
KETONES UR STRIP.AUTO-MCNC: NEGATIVE MG/DL
LEUKOCYTE ESTERASE UR QL STRIP.AUTO: NEGATIVE
MICRO URNS: ABNORMAL
NITRITE UR QL STRIP.AUTO: NEGATIVE
PH UR STRIP.AUTO: 5.5 [PH]
PROT UR QL STRIP: 100 MG/DL
RBC # URNS HPF: ABNORMAL /HPF
RBC UR QL AUTO: NEGATIVE
SP GR UR STRIP.AUTO: 1.02
UROBILINOGEN UR STRIP.AUTO-MCNC: 0.2 MG/DL
WBC #/AREA URNS HPF: ABNORMAL /HPF

## 2018-11-12 ENCOUNTER — TELEPHONE (OUTPATIENT)
Dept: MEDICAL GROUP | Facility: PHYSICIAN GROUP | Age: 83
End: 2018-11-12

## 2018-11-12 NOTE — TELEPHONE ENCOUNTER
----- Message from Kathrine Melgoza M.D. sent at 11/12/2018  8:26 AM PST -----  Please let patient's family know that there was no urine infection found.  -Kathrine Melgoza M.D.

## 2018-11-12 NOTE — TELEPHONE ENCOUNTER
Phone Number Called: [phone] (home)     Message: Boaz - relative informed of normal result.    Left Message for patient to call back: no

## 2019-01-01 ENCOUNTER — HOSPITAL ENCOUNTER (OUTPATIENT)
Dept: RADIOLOGY | Facility: MEDICAL CENTER | Age: 84
End: 2019-10-18
Attending: NURSE PRACTITIONER
Payer: MEDICARE

## 2019-01-01 ENCOUNTER — APPOINTMENT (OUTPATIENT)
Dept: WOUND CARE | Facility: MEDICAL CENTER | Age: 84
End: 2019-01-01
Attending: FAMILY MEDICINE
Payer: MEDICARE

## 2019-01-01 ENCOUNTER — HOME CARE VISIT (OUTPATIENT)
Dept: HOME HEALTH SERVICES | Facility: HOME HEALTHCARE | Age: 84
End: 2019-01-01
Payer: MEDICARE

## 2019-01-01 ENCOUNTER — HOSPITAL ENCOUNTER (OUTPATIENT)
Dept: LAB | Facility: MEDICAL CENTER | Age: 84
End: 2019-10-31
Attending: SURGERY
Payer: MEDICARE

## 2019-01-01 ENCOUNTER — HOSPITAL ENCOUNTER (OUTPATIENT)
Facility: MEDICAL CENTER | Age: 84
End: 2019-10-07
Attending: NURSE PRACTITIONER
Payer: MEDICARE

## 2019-01-01 ENCOUNTER — NON-PROVIDER VISIT (OUTPATIENT)
Dept: MEDICAL GROUP | Facility: PHYSICIAN GROUP | Age: 84
End: 2019-01-01
Payer: MEDICARE

## 2019-01-01 ENCOUNTER — OFFICE VISIT (OUTPATIENT)
Dept: URGENT CARE | Facility: PHYSICIAN GROUP | Age: 84
End: 2019-01-01
Payer: MEDICARE

## 2019-01-01 ENCOUNTER — APPOINTMENT (OUTPATIENT)
Dept: MEDICAL GROUP | Facility: PHYSICIAN GROUP | Age: 84
End: 2019-01-01
Payer: MEDICARE

## 2019-01-01 ENCOUNTER — HOME HEALTH ADMISSION (OUTPATIENT)
Dept: HOME HEALTH SERVICES | Facility: HOME HEALTHCARE | Age: 84
End: 2019-01-01
Payer: MEDICARE

## 2019-01-01 ENCOUNTER — ANTICOAGULATION MONITORING (OUTPATIENT)
Dept: MEDICAL GROUP | Facility: PHYSICIAN GROUP | Age: 84
End: 2019-01-01

## 2019-01-01 ENCOUNTER — HOSPITAL ENCOUNTER (OUTPATIENT)
Dept: LAB | Facility: MEDICAL CENTER | Age: 84
End: 2019-07-05
Attending: FAMILY MEDICINE
Payer: MEDICARE

## 2019-01-01 ENCOUNTER — OFFICE VISIT (OUTPATIENT)
Dept: MEDICAL GROUP | Facility: PHYSICIAN GROUP | Age: 84
End: 2019-01-01
Payer: MEDICARE

## 2019-01-01 ENCOUNTER — HOSPITAL ENCOUNTER (OUTPATIENT)
Dept: LAB | Facility: MEDICAL CENTER | Age: 84
End: 2019-10-18
Attending: NURSE PRACTITIONER
Payer: MEDICARE

## 2019-01-01 ENCOUNTER — TELEPHONE (OUTPATIENT)
Dept: MEDICAL GROUP | Facility: PHYSICIAN GROUP | Age: 84
End: 2019-01-01

## 2019-01-01 VITALS
DIASTOLIC BLOOD PRESSURE: 64 MMHG | RESPIRATION RATE: 17 BRPM | HEART RATE: 92 BPM | SYSTOLIC BLOOD PRESSURE: 122 MMHG | OXYGEN SATURATION: 95 % | TEMPERATURE: 98.4 F

## 2019-01-01 VITALS
HEART RATE: 83 BPM | TEMPERATURE: 97.8 F | OXYGEN SATURATION: 98 % | RESPIRATION RATE: 18 BRPM | DIASTOLIC BLOOD PRESSURE: 60 MMHG | SYSTOLIC BLOOD PRESSURE: 118 MMHG

## 2019-01-01 VITALS
OXYGEN SATURATION: 95 % | RESPIRATION RATE: 16 BRPM | TEMPERATURE: 98.7 F | HEART RATE: 96 BPM | SYSTOLIC BLOOD PRESSURE: 124 MMHG | DIASTOLIC BLOOD PRESSURE: 64 MMHG

## 2019-01-01 VITALS
RESPIRATION RATE: 14 BRPM | OXYGEN SATURATION: 97 % | HEART RATE: 69 BPM | TEMPERATURE: 97.9 F | WEIGHT: 131 LBS | SYSTOLIC BLOOD PRESSURE: 126 MMHG | HEIGHT: 65 IN | BODY MASS INDEX: 21.83 KG/M2 | DIASTOLIC BLOOD PRESSURE: 70 MMHG

## 2019-01-01 VITALS
SYSTOLIC BLOOD PRESSURE: 126 MMHG | RESPIRATION RATE: 16 BRPM | TEMPERATURE: 98.3 F | DIASTOLIC BLOOD PRESSURE: 62 MMHG | HEART RATE: 92 BPM | OXYGEN SATURATION: 95 %

## 2019-01-01 VITALS
OXYGEN SATURATION: 95 % | HEIGHT: 65 IN | HEART RATE: 68 BPM | RESPIRATION RATE: 16 BRPM | SYSTOLIC BLOOD PRESSURE: 170 MMHG | DIASTOLIC BLOOD PRESSURE: 80 MMHG | TEMPERATURE: 98 F | WEIGHT: 131.8 LBS | BODY MASS INDEX: 21.96 KG/M2

## 2019-01-01 VITALS
HEART RATE: 69 BPM | OXYGEN SATURATION: 96 % | SYSTOLIC BLOOD PRESSURE: 130 MMHG | TEMPERATURE: 97.4 F | RESPIRATION RATE: 18 BRPM | DIASTOLIC BLOOD PRESSURE: 70 MMHG

## 2019-01-01 VITALS
HEART RATE: 75 BPM | DIASTOLIC BLOOD PRESSURE: 77 MMHG | TEMPERATURE: 97.9 F | SYSTOLIC BLOOD PRESSURE: 181 MMHG | OXYGEN SATURATION: 96 % | RESPIRATION RATE: 18 BRPM

## 2019-01-01 VITALS
DIASTOLIC BLOOD PRESSURE: 58 MMHG | OXYGEN SATURATION: 95 % | TEMPERATURE: 98.3 F | HEART RATE: 89 BPM | SYSTOLIC BLOOD PRESSURE: 124 MMHG | RESPIRATION RATE: 16 BRPM

## 2019-01-01 VITALS
SYSTOLIC BLOOD PRESSURE: 129 MMHG | RESPIRATION RATE: 17 BRPM | DIASTOLIC BLOOD PRESSURE: 71 MMHG | TEMPERATURE: 97.8 F | HEIGHT: 65 IN | BODY MASS INDEX: 21.99 KG/M2 | WEIGHT: 132 LBS | HEART RATE: 71 BPM | OXYGEN SATURATION: 98 %

## 2019-01-01 VITALS
RESPIRATION RATE: 16 BRPM | DIASTOLIC BLOOD PRESSURE: 62 MMHG | OXYGEN SATURATION: 98 % | OXYGEN SATURATION: 98 % | DIASTOLIC BLOOD PRESSURE: 66 MMHG | RESPIRATION RATE: 17 BRPM | TEMPERATURE: 99.8 F | SYSTOLIC BLOOD PRESSURE: 122 MMHG | HEART RATE: 70 BPM | SYSTOLIC BLOOD PRESSURE: 124 MMHG | TEMPERATURE: 98.7 F | HEART RATE: 91 BPM

## 2019-01-01 VITALS
HEART RATE: 86 BPM | DIASTOLIC BLOOD PRESSURE: 62 MMHG | TEMPERATURE: 98.1 F | OXYGEN SATURATION: 94 % | RESPIRATION RATE: 16 BRPM | SYSTOLIC BLOOD PRESSURE: 110 MMHG

## 2019-01-01 VITALS
DIASTOLIC BLOOD PRESSURE: 60 MMHG | RESPIRATION RATE: 18 BRPM | SYSTOLIC BLOOD PRESSURE: 122 MMHG | OXYGEN SATURATION: 98 % | HEART RATE: 92 BPM | TEMPERATURE: 98 F

## 2019-01-01 VITALS
RESPIRATION RATE: 18 BRPM | TEMPERATURE: 97.8 F | DIASTOLIC BLOOD PRESSURE: 58 MMHG | OXYGEN SATURATION: 99 % | SYSTOLIC BLOOD PRESSURE: 122 MMHG | HEART RATE: 80 BPM

## 2019-01-01 VITALS
TEMPERATURE: 97.5 F | SYSTOLIC BLOOD PRESSURE: 112 MMHG | OXYGEN SATURATION: 98 % | DIASTOLIC BLOOD PRESSURE: 58 MMHG | RESPIRATION RATE: 17 BRPM | HEART RATE: 87 BPM

## 2019-01-01 VITALS
TEMPERATURE: 98.1 F | HEART RATE: 80 BPM | SYSTOLIC BLOOD PRESSURE: 124 MMHG | OXYGEN SATURATION: 98 % | DIASTOLIC BLOOD PRESSURE: 64 MMHG | RESPIRATION RATE: 16 BRPM

## 2019-01-01 VITALS
HEART RATE: 85 BPM | SYSTOLIC BLOOD PRESSURE: 118 MMHG | RESPIRATION RATE: 18 BRPM | TEMPERATURE: 98.5 F | OXYGEN SATURATION: 97 % | DIASTOLIC BLOOD PRESSURE: 62 MMHG

## 2019-01-01 VITALS
RESPIRATION RATE: 16 BRPM | OXYGEN SATURATION: 99 % | TEMPERATURE: 98.1 F | SYSTOLIC BLOOD PRESSURE: 110 MMHG | HEART RATE: 86 BPM | DIASTOLIC BLOOD PRESSURE: 62 MMHG

## 2019-01-01 VITALS
OXYGEN SATURATION: 98 % | SYSTOLIC BLOOD PRESSURE: 124 MMHG | HEART RATE: 77 BPM | TEMPERATURE: 97.9 F | DIASTOLIC BLOOD PRESSURE: 67 MMHG | RESPIRATION RATE: 16 BRPM

## 2019-01-01 VITALS
OXYGEN SATURATION: 98 % | HEART RATE: 84 BPM | DIASTOLIC BLOOD PRESSURE: 58 MMHG | SYSTOLIC BLOOD PRESSURE: 104 MMHG | RESPIRATION RATE: 16 BRPM | TEMPERATURE: 97.9 F

## 2019-01-01 VITALS
OXYGEN SATURATION: 97 % | TEMPERATURE: 98.6 F | RESPIRATION RATE: 18 BRPM | HEART RATE: 89 BPM | DIASTOLIC BLOOD PRESSURE: 60 MMHG | SYSTOLIC BLOOD PRESSURE: 104 MMHG

## 2019-01-01 VITALS
SYSTOLIC BLOOD PRESSURE: 118 MMHG | RESPIRATION RATE: 17 BRPM | OXYGEN SATURATION: 97 % | DIASTOLIC BLOOD PRESSURE: 68 MMHG | TEMPERATURE: 99.2 F | HEART RATE: 87 BPM

## 2019-01-01 VITALS
RESPIRATION RATE: 16 BRPM | OXYGEN SATURATION: 95 % | HEART RATE: 78 BPM | SYSTOLIC BLOOD PRESSURE: 121 MMHG | TEMPERATURE: 97.9 F | DIASTOLIC BLOOD PRESSURE: 62 MMHG

## 2019-01-01 VITALS
TEMPERATURE: 97.8 F | RESPIRATION RATE: 18 BRPM | OXYGEN SATURATION: 98 % | SYSTOLIC BLOOD PRESSURE: 120 MMHG | DIASTOLIC BLOOD PRESSURE: 80 MMHG | HEART RATE: 80 BPM

## 2019-01-01 VITALS
TEMPERATURE: 97.9 F | OXYGEN SATURATION: 98 % | RESPIRATION RATE: 16 BRPM | HEART RATE: 75 BPM | SYSTOLIC BLOOD PRESSURE: 122 MMHG | DIASTOLIC BLOOD PRESSURE: 64 MMHG

## 2019-01-01 VITALS
TEMPERATURE: 97.2 F | OXYGEN SATURATION: 98 % | HEART RATE: 82 BPM | DIASTOLIC BLOOD PRESSURE: 60 MMHG | SYSTOLIC BLOOD PRESSURE: 126 MMHG | RESPIRATION RATE: 16 BRPM

## 2019-01-01 VITALS
HEART RATE: 82 BPM | DIASTOLIC BLOOD PRESSURE: 64 MMHG | RESPIRATION RATE: 16 BRPM | SYSTOLIC BLOOD PRESSURE: 112 MMHG | OXYGEN SATURATION: 95 % | TEMPERATURE: 98.5 F

## 2019-01-01 VITALS
OXYGEN SATURATION: 96 % | DIASTOLIC BLOOD PRESSURE: 62 MMHG | RESPIRATION RATE: 16 BRPM | SYSTOLIC BLOOD PRESSURE: 126 MMHG | HEART RATE: 96 BPM | TEMPERATURE: 98 F

## 2019-01-01 VITALS
DIASTOLIC BLOOD PRESSURE: 58 MMHG | HEART RATE: 87 BPM | RESPIRATION RATE: 17 BRPM | SYSTOLIC BLOOD PRESSURE: 112 MMHG | TEMPERATURE: 97.5 F | OXYGEN SATURATION: 98 %

## 2019-01-01 VITALS
SYSTOLIC BLOOD PRESSURE: 118 MMHG | OXYGEN SATURATION: 96 % | DIASTOLIC BLOOD PRESSURE: 60 MMHG | TEMPERATURE: 98.6 F | HEART RATE: 112 BPM | RESPIRATION RATE: 16 BRPM

## 2019-01-01 VITALS
OXYGEN SATURATION: 97 % | SYSTOLIC BLOOD PRESSURE: 108 MMHG | HEART RATE: 83 BPM | RESPIRATION RATE: 18 BRPM | TEMPERATURE: 98 F | DIASTOLIC BLOOD PRESSURE: 60 MMHG

## 2019-01-01 VITALS — SYSTOLIC BLOOD PRESSURE: 110 MMHG | DIASTOLIC BLOOD PRESSURE: 68 MMHG

## 2019-01-01 VITALS
RESPIRATION RATE: 16 BRPM | SYSTOLIC BLOOD PRESSURE: 110 MMHG | OXYGEN SATURATION: 98 % | DIASTOLIC BLOOD PRESSURE: 64 MMHG | HEART RATE: 82 BPM | TEMPERATURE: 97.6 F

## 2019-01-01 VITALS
HEART RATE: 85 BPM | DIASTOLIC BLOOD PRESSURE: 62 MMHG | SYSTOLIC BLOOD PRESSURE: 118 MMHG | RESPIRATION RATE: 18 BRPM | OXYGEN SATURATION: 97 % | TEMPERATURE: 98.5 F

## 2019-01-01 VITALS
OXYGEN SATURATION: 96 % | HEART RATE: 91 BPM | RESPIRATION RATE: 16 BRPM | TEMPERATURE: 98.4 F | DIASTOLIC BLOOD PRESSURE: 62 MMHG | SYSTOLIC BLOOD PRESSURE: 128 MMHG

## 2019-01-01 VITALS
OXYGEN SATURATION: 93 % | RESPIRATION RATE: 22 BRPM | DIASTOLIC BLOOD PRESSURE: 60 MMHG | TEMPERATURE: 98.3 F | SYSTOLIC BLOOD PRESSURE: 118 MMHG | HEART RATE: 76 BPM

## 2019-01-01 VITALS
HEART RATE: 75 BPM | SYSTOLIC BLOOD PRESSURE: 163 MMHG | OXYGEN SATURATION: 99 % | TEMPERATURE: 96.6 F | DIASTOLIC BLOOD PRESSURE: 88 MMHG

## 2019-01-01 VITALS — DIASTOLIC BLOOD PRESSURE: 60 MMHG | SYSTOLIC BLOOD PRESSURE: 122 MMHG

## 2019-01-01 DIAGNOSIS — L97.522 SKIN ULCER OF TOE OF LEFT FOOT WITH FAT LAYER EXPOSED (HCC): ICD-10-CM

## 2019-01-01 DIAGNOSIS — M79.604 PAIN IN BOTH LOWER EXTREMITIES: ICD-10-CM

## 2019-01-01 DIAGNOSIS — R09.89 ABSENT PEDAL PULSES: ICD-10-CM

## 2019-01-01 DIAGNOSIS — L08.9 TOE INFECTION: ICD-10-CM

## 2019-01-01 DIAGNOSIS — T14.8XXA WOUND INFECTION: ICD-10-CM

## 2019-01-01 DIAGNOSIS — N18.30 STAGE 3 CHRONIC KIDNEY DISEASE (HCC): ICD-10-CM

## 2019-01-01 DIAGNOSIS — L08.9 WOUND INFECTION: ICD-10-CM

## 2019-01-01 DIAGNOSIS — M79.675 PAIN OF TOE OF LEFT FOOT: ICD-10-CM

## 2019-01-01 DIAGNOSIS — I69.30 HISTORY OF CEREBROVASCULAR ACCIDENT (CVA) WITH RESIDUAL DEFICIT: ICD-10-CM

## 2019-01-01 DIAGNOSIS — I10 ESSENTIAL HYPERTENSION: ICD-10-CM

## 2019-01-01 DIAGNOSIS — M79.605 PAIN IN BOTH LOWER EXTREMITIES: ICD-10-CM

## 2019-01-01 DIAGNOSIS — R09.89 DECREASED PEDAL PULSES: ICD-10-CM

## 2019-01-01 DIAGNOSIS — Z51.5 PALLIATIVE CARE STATUS: ICD-10-CM

## 2019-01-01 DIAGNOSIS — I73.9 PAD (PERIPHERAL ARTERY DISEASE) (HCC): ICD-10-CM

## 2019-01-01 DIAGNOSIS — L03.116 CELLULITIS OF LEFT LOWER EXTREMITY: ICD-10-CM

## 2019-01-01 DIAGNOSIS — L97.522 SKIN ULCER OF TOE OF LEFT FOOT WITH FAT LAYER EXPOSED (HCC): Primary | ICD-10-CM

## 2019-01-01 DIAGNOSIS — F02.80 LATE ONSET ALZHEIMER'S DISEASE WITHOUT BEHAVIORAL DISTURBANCE (HCC): ICD-10-CM

## 2019-01-01 DIAGNOSIS — Z00.00 MEDICARE ANNUAL WELLNESS VISIT, INITIAL: ICD-10-CM

## 2019-01-01 DIAGNOSIS — G30.1 LATE ONSET ALZHEIMER'S DISEASE WITHOUT BEHAVIORAL DISTURBANCE (HCC): ICD-10-CM

## 2019-01-01 DIAGNOSIS — I25.10 CORONARY ARTERY DISEASE INVOLVING NATIVE CORONARY ARTERY OF NATIVE HEART WITHOUT ANGINA PECTORIS: ICD-10-CM

## 2019-01-01 LAB
ANION GAP SERPL CALC-SCNC: 10 MMOL/L (ref 0–11.9)
ANION GAP SERPL CALC-SCNC: 8 MMOL/L (ref 0–11.9)
ANION GAP SERPL CALC-SCNC: 8 MMOL/L (ref 0–11.9)
BACTERIA WND AEROBE CULT: ABNORMAL
BACTERIA WND AEROBE CULT: ABNORMAL
BASOPHILS # BLD AUTO: 0.3 % (ref 0–1.8)
BASOPHILS # BLD: 0.02 K/UL (ref 0–0.12)
BUN SERPL-MCNC: 20 MG/DL (ref 8–22)
BUN SERPL-MCNC: 27 MG/DL (ref 8–22)
BUN SERPL-MCNC: 30 MG/DL (ref 8–22)
CALCIUM SERPL-MCNC: 8.9 MG/DL (ref 8.5–10.5)
CALCIUM SERPL-MCNC: 9.3 MG/DL (ref 8.5–10.5)
CALCIUM SERPL-MCNC: 9.4 MG/DL (ref 8.5–10.5)
CHLORIDE SERPL-SCNC: 107 MMOL/L (ref 96–112)
CHLORIDE SERPL-SCNC: 108 MMOL/L (ref 96–112)
CHLORIDE SERPL-SCNC: 109 MMOL/L (ref 96–112)
CO2 SERPL-SCNC: 17 MMOL/L (ref 20–33)
CO2 SERPL-SCNC: 26 MMOL/L (ref 20–33)
CO2 SERPL-SCNC: 26 MMOL/L (ref 20–33)
CREAT SERPL-MCNC: 1.28 MG/DL (ref 0.5–1.4)
CREAT SERPL-MCNC: 1.35 MG/DL (ref 0.5–1.4)
CREAT SERPL-MCNC: 1.57 MG/DL (ref 0.5–1.4)
EOSINOPHIL # BLD AUTO: 0.24 K/UL (ref 0–0.51)
EOSINOPHIL NFR BLD: 3.6 % (ref 0–6.9)
ERYTHROCYTE [DISTWIDTH] IN BLOOD BY AUTOMATED COUNT: 53 FL (ref 35.9–50)
GLUCOSE SERPL-MCNC: 51 MG/DL (ref 65–99)
GLUCOSE SERPL-MCNC: 59 MG/DL (ref 65–99)
GLUCOSE SERPL-MCNC: 83 MG/DL (ref 65–99)
GRAM STN SPEC: ABNORMAL
GRAM STN SPEC: NORMAL
HCT VFR BLD AUTO: 45.7 % (ref 42–52)
HGB BLD-MCNC: 14.7 G/DL (ref 14–18)
IMM GRANULOCYTES # BLD AUTO: 0.01 K/UL (ref 0–0.11)
IMM GRANULOCYTES NFR BLD AUTO: 0.1 % (ref 0–0.9)
LYMPHOCYTES # BLD AUTO: 0.97 K/UL (ref 1–4.8)
LYMPHOCYTES NFR BLD: 14.5 % (ref 22–41)
MCH RBC QN AUTO: 32.5 PG (ref 27–33)
MCHC RBC AUTO-ENTMCNC: 32.2 G/DL (ref 33.7–35.3)
MCV RBC AUTO: 101.1 FL (ref 81.4–97.8)
MONOCYTES # BLD AUTO: 0.75 K/UL (ref 0–0.85)
MONOCYTES NFR BLD AUTO: 11.2 % (ref 0–13.4)
NEUTROPHILS # BLD AUTO: 4.68 K/UL (ref 1.82–7.42)
NEUTROPHILS NFR BLD: 70.3 % (ref 44–72)
NRBC # BLD AUTO: 0 K/UL
NRBC BLD-RTO: 0 /100 WBC
PLATELET # BLD AUTO: 248 K/UL (ref 164–446)
PMV BLD AUTO: 10 FL (ref 9–12.9)
POTASSIUM SERPL-SCNC: 4.4 MMOL/L (ref 3.6–5.5)
POTASSIUM SERPL-SCNC: 4.8 MMOL/L (ref 3.6–5.5)
POTASSIUM SERPL-SCNC: 4.8 MMOL/L (ref 3.6–5.5)
RBC # BLD AUTO: 4.52 M/UL (ref 4.7–6.1)
SIGNIFICANT IND 70042: ABNORMAL
SIGNIFICANT IND 70042: NORMAL
SITE SITE: ABNORMAL
SITE SITE: NORMAL
SODIUM SERPL-SCNC: 136 MMOL/L (ref 135–145)
SODIUM SERPL-SCNC: 141 MMOL/L (ref 135–145)
SODIUM SERPL-SCNC: 142 MMOL/L (ref 135–145)
SOURCE SOURCE: ABNORMAL
SOURCE SOURCE: NORMAL
WBC # BLD AUTO: 6.7 K/UL (ref 4.8–10.8)

## 2019-01-01 PROCEDURE — 665999 HH PPS REVENUE DEBIT

## 2019-01-01 PROCEDURE — 665998 HH PPS REVENUE CREDIT

## 2019-01-01 PROCEDURE — A6403 STERILE GAUZE>16 <= 48 SQ IN: HCPCS

## 2019-01-01 PROCEDURE — G0151 HHCP-SERV OF PT,EA 15 MIN: HCPCS

## 2019-01-01 PROCEDURE — G0299 HHS/HOSPICE OF RN EA 15 MIN: HCPCS

## 2019-01-01 PROCEDURE — 99211 OFF/OP EST MAY X REQ PHY/QHP: CPT

## 2019-01-01 PROCEDURE — 99214 OFFICE O/P EST MOD 30 MIN: CPT | Performed by: FAMILY MEDICINE

## 2019-01-01 PROCEDURE — A6210 FOAM DRG >16<=48 SQ IN W/O B: HCPCS

## 2019-01-01 PROCEDURE — 99213 OFFICE O/P EST LOW 20 MIN: CPT

## 2019-01-01 PROCEDURE — 85025 COMPLETE CBC W/AUTO DIFF WBC: CPT

## 2019-01-01 PROCEDURE — G0493 RN CARE EA 15 MIN HH/HOSPICE: HCPCS

## 2019-01-01 PROCEDURE — 99214 OFFICE O/P EST MOD 30 MIN: CPT

## 2019-01-01 PROCEDURE — 80048 BASIC METABOLIC PNL TOTAL CA: CPT

## 2019-01-01 PROCEDURE — 36415 COLL VENOUS BLD VENIPUNCTURE: CPT

## 2019-01-01 PROCEDURE — G0495 RN CARE TRAIN/EDU IN HH: HCPCS

## 2019-01-01 PROCEDURE — 93922 UPR/L XTREMITY ART 2 LEVELS: CPT

## 2019-01-01 PROCEDURE — G0180 MD CERTIFICATION HHA PATIENT: HCPCS | Performed by: FAMILY MEDICINE

## 2019-01-01 PROCEDURE — 87077 CULTURE AEROBIC IDENTIFY: CPT

## 2019-01-01 PROCEDURE — 87070 CULTURE OTHR SPECIMN AEROBIC: CPT

## 2019-01-01 PROCEDURE — 99214 OFFICE O/P EST MOD 30 MIN: CPT | Performed by: NURSE PRACTITIONER

## 2019-01-01 PROCEDURE — G0439 PPPS, SUBSEQ VISIT: HCPCS | Performed by: FAMILY MEDICINE

## 2019-01-01 PROCEDURE — 665003 FOLLOW UP-HOME HEALTH

## 2019-01-01 PROCEDURE — 665997 HH PPS REVENUE ADJ

## 2019-01-01 PROCEDURE — A4452 WATERPROOF TAPE: HCPCS

## 2019-01-01 PROCEDURE — 97602 WOUND(S) CARE NON-SELECTIVE: CPT

## 2019-01-01 PROCEDURE — 87186 SC STD MICRODIL/AGAR DIL: CPT

## 2019-01-01 PROCEDURE — A4450 NON-WATERPROOF TAPE: HCPCS

## 2019-01-01 PROCEDURE — 665001 SOC-HOME HEALTH

## 2019-01-01 PROCEDURE — 87205 SMEAR GRAM STAIN: CPT

## 2019-01-01 RX ORDER — SULFAMETHOXAZOLE AND TRIMETHOPRIM 800; 160 MG/1; MG/1
1 TABLET ORAL 2 TIMES DAILY
Qty: 20 TAB | Refills: 0 | Status: SHIPPED | OUTPATIENT
Start: 2019-01-01 | End: 2019-01-01

## 2019-01-01 RX ORDER — AMLODIPINE BESYLATE 5 MG/1
5 TABLET ORAL DAILY
Qty: 30 TAB | Refills: 5 | Status: SHIPPED
Start: 2019-01-01 | End: 2020-01-01

## 2019-01-01 RX ORDER — SULFAMETHOXAZOLE AND TRIMETHOPRIM 800; 160 MG/1; MG/1
1 TABLET ORAL 2 TIMES DAILY
Qty: 14 TAB | Refills: 0 | Status: SHIPPED | OUTPATIENT
Start: 2019-01-01 | End: 2019-01-01

## 2019-01-01 RX ORDER — LATANOPROST 50 UG/ML
SOLUTION/ DROPS OPHTHALMIC
Qty: 2.5 ML | Refills: 11 | Status: SHIPPED
Start: 2019-01-01 | End: 2020-01-01

## 2019-01-01 RX ORDER — AMLODIPINE BESYLATE 5 MG/1
5 TABLET ORAL DAILY
Qty: 30 TAB | Refills: 2 | Status: SHIPPED | OUTPATIENT
Start: 2019-01-01 | End: 2019-01-01 | Stop reason: SDUPTHER

## 2019-01-01 ASSESSMENT — ENCOUNTER SYMPTOMS
MUSCLE WEAKNESS: 1
NAUSEA: DENIES
NAUSEA: DENIES
VOMITING: DENIES
VOMITING: DENIES
SHORTNESS OF BREATH: 0
DEBILITATING PAIN: 1
DEBILITATING PAIN: 1
DIFFICULTY THINKING: 1
NAUSEA: DENIES
DEBILITATING PAIN: 1
VOMITING: DENIES
DEBILITATING PAIN: 1
POOR JUDGMENT: 1
DIARRHEA: 0
VOMITING: DENIES
VOMITING: NO
VOMITING: DENIES
NAUSEA: 0
VOMITING: DENIES
MUSCLE WEAKNESS: 1
NAUSEA: NO
COUGH: 0
COUGH: 0
NAUSEA: DENIES
VOMITING: DENIES
MENTAL STATUS CHANGE: 0
NAUSEA: DENIES
VOMITING: DENIES
NAUSEA: 0
FEVER: 0
CHILLS: 0
DIFFICULTY THINKING: 1
DEBILITATING PAIN: 1
ABDOMINAL PAIN: 0
MUSCLE WEAKNESS: 1
ABDOMINAL PAIN: 0
VOMITING: 0
COUGH: 0
NAUSEA: DENIES
VOMITING: DENIES
CLAUDICATION: 0
NAUSEA: DENIES
MUSCLE WEAKNESS: 1
CONSTIPATION: 0
CLAUDICATION: 0
DEBILITATING PAIN: 1
NAUSEA: 0
NAUSEA: DENIES
CLAUDICATION: 0
DEBILITATING PAIN: 1
VOMITING: 0
VOMITING: DENIES
VOMITING: DENIES
NAUSEA: DENIES
DEBILITATING PAIN: 1
DIFFICULTY THINKING: 1
VOMITING: DENIES
VOMITING: DENIES
DEBILITATING PAIN: 1
NAUSEA: DENIES
DIFFICULTY THINKING: 1
VOMITING: DENIES
DIARRHEA: 0
NAUSEA: DENIES
NAUSEA: NO
CONSTIPATION: 0
CHILLS: 0
MUSCLE WEAKNESS: 1
NAUSEA: DENIES
VOMITING: DENIES
VOMITING: DENIES
FEVER: 0
DIARRHEA: 0
DEBILITATING PAIN: 1
VOMITING: DENIES
NAUSEA: DENIES
VOMITING: NO
AGITATION: 1
NAUSEA: DENIES
FEVER: 0
VOMITING: DENIES
DEBILITATING PAIN: 1
VOMITING: DENIES
VOMITING: 0
NAUSEA: DENIES
SHORTNESS OF BREATH: 0
MUSCLE WEAKNESS: 1
VOMITING: NO
NAUSEA: NO
SHORTNESS OF BREATH: 0
CONSTIPATION: 0
DIFFICULTY THINKING: 1
ABDOMINAL PAIN: 0
VOMITING: DENIES
VOMITING: DENIES
CHILLS: 0
NAUSEA: DENIES
VOMITING: DENIES
NAUSEA: DENIES
NAUSEA: NO
VOMITING: NO
DIFFICULTY THINKING: 1
GENERAL WELL-BEING: GOOD
NAUSEA: DENIES

## 2019-01-01 ASSESSMENT — ACTIVITIES OF DAILY LIVING (ADL)
BATHING_REQUIRES_ASSISTANCE: 1
OASIS_M1830: 03
AMBULATION ASSISTANCE ON FLAT SURFACES: 1

## 2019-01-01 ASSESSMENT — PATIENT HEALTH QUESTIONNAIRE - PHQ9
CLINICAL INTERPRETATION OF PHQ2 SCORE: 0
1. LITTLE INTEREST OR PLEASURE IN DOING THINGS: 00
2. FEELING DOWN, DEPRESSED, IRRITABLE, OR HOPELESS: 00
CLINICAL INTERPRETATION OF PHQ2 SCORE: 0
CLINICAL INTERPRETATION OF PHQ2 SCORE: 0

## 2019-01-01 ASSESSMENT — PAIN SCALES - GENERAL: PAINLEVEL: 6=MODERATE PAIN

## 2019-01-23 ENCOUNTER — HOSPITAL ENCOUNTER (OUTPATIENT)
Dept: LAB | Facility: MEDICAL CENTER | Age: 84
End: 2019-01-23
Attending: FAMILY MEDICINE
Payer: MEDICARE

## 2019-01-23 DIAGNOSIS — I10 ESSENTIAL HYPERTENSION: ICD-10-CM

## 2019-01-23 LAB
ANION GAP SERPL CALC-SCNC: 8 MMOL/L (ref 0–11.9)
BUN SERPL-MCNC: 22 MG/DL (ref 8–22)
CALCIUM SERPL-MCNC: 9.6 MG/DL (ref 8.5–10.5)
CHLORIDE SERPL-SCNC: 107 MMOL/L (ref 96–112)
CO2 SERPL-SCNC: 26 MMOL/L (ref 20–33)
CREAT SERPL-MCNC: 1.35 MG/DL (ref 0.5–1.4)
GLUCOSE SERPL-MCNC: 90 MG/DL (ref 65–99)
POTASSIUM SERPL-SCNC: 4.9 MMOL/L (ref 3.6–5.5)
SODIUM SERPL-SCNC: 141 MMOL/L (ref 135–145)

## 2019-01-23 PROCEDURE — 36415 COLL VENOUS BLD VENIPUNCTURE: CPT

## 2019-01-23 PROCEDURE — 80048 BASIC METABOLIC PNL TOTAL CA: CPT

## 2019-02-06 ENCOUNTER — OFFICE VISIT (OUTPATIENT)
Dept: MEDICAL GROUP | Facility: PHYSICIAN GROUP | Age: 84
End: 2019-02-06
Payer: MEDICARE

## 2019-02-06 VITALS
DIASTOLIC BLOOD PRESSURE: 60 MMHG | BODY MASS INDEX: 22.82 KG/M2 | HEIGHT: 65 IN | TEMPERATURE: 97 F | SYSTOLIC BLOOD PRESSURE: 102 MMHG | HEART RATE: 74 BPM | WEIGHT: 137 LBS | RESPIRATION RATE: 18 BRPM | OXYGEN SATURATION: 97 %

## 2019-02-06 DIAGNOSIS — N18.30 STAGE 3 CHRONIC KIDNEY DISEASE (HCC): ICD-10-CM

## 2019-02-06 DIAGNOSIS — Z66 PHYSICIAN ORDERS FOR LIFE-SUSTAINING TREATMENT (POLST) FORM INDICATES PATIENT WISH FOR DO-NOT-RESUSCITATE STATUS: ICD-10-CM

## 2019-02-06 DIAGNOSIS — I25.10 CORONARY ARTERY DISEASE INVOLVING NATIVE CORONARY ARTERY OF NATIVE HEART WITHOUT ANGINA PECTORIS: ICD-10-CM

## 2019-02-06 DIAGNOSIS — I69.30 HISTORY OF CEREBROVASCULAR ACCIDENT (CVA) WITH RESIDUAL DEFICIT: ICD-10-CM

## 2019-02-06 DIAGNOSIS — Z86.79 HISTORY OF HYPERTENSION: ICD-10-CM

## 2019-02-06 DIAGNOSIS — G30.1 LATE ONSET ALZHEIMER'S DISEASE WITHOUT BEHAVIORAL DISTURBANCE (HCC): ICD-10-CM

## 2019-02-06 DIAGNOSIS — H40.89 OTHER GLAUCOMA OF BOTH EYES: ICD-10-CM

## 2019-02-06 DIAGNOSIS — F02.80 LATE ONSET ALZHEIMER'S DISEASE WITHOUT BEHAVIORAL DISTURBANCE (HCC): ICD-10-CM

## 2019-02-06 DIAGNOSIS — Z51.5 PALLIATIVE CARE STATUS: ICD-10-CM

## 2019-02-06 PROCEDURE — 99214 OFFICE O/P EST MOD 30 MIN: CPT | Performed by: FAMILY MEDICINE

## 2019-02-06 ASSESSMENT — PATIENT HEALTH QUESTIONNAIRE - PHQ9: CLINICAL INTERPRETATION OF PHQ2 SCORE: 0

## 2019-02-06 NOTE — PROGRESS NOTES
"Subjective:   Jt Arevalo is a 85 y.o. male here today for follow-up glaucoma, dementia, chronic kidney disease and POLST form.  He is accompanied by his nephew, Boaz.    Jt tells me that he is doing \"well.\"  He does not have any concerns or issues he would like to discuss.  He has been following closely with Dr. sanderson for his glaucoma.  He is now on 4 eyedrops.  He is unsure of the names of the drops.  He does have vision loss in his left eye, but his right eye is doing well.    His vital signs look wonderful today.  He has not needed to restart blood pressure medication.  We had a long discussion the several visits ago and agreed on discontinuing many of his medications and taking more palliative approach.  Boaz requests that I fill out a new POLST form for him.  Jt and Boaz agree that he would like to be a DNR status.  They also have paperwork for a DMV handicap placard.    Current medicines (including changes today)  Current Outpatient Prescriptions   Medication Sig Dispense Refill   • SIMBRINZA 1-0.2 % Suspension 1 Drop by Ophthalmic route 3 times a day.  3   • latanoprost (XALATAN) 0.005 % Solution Place 1 Drop in both eyes every day. 1 Bottle 11   • aspirin EC (ECOTRIN) 81 MG Tablet Delayed Response Take 81 mg by mouth every day.       No current facility-administered medications for this visit.      He  has a past medical history of Anxiety; Arthritis; Cancer (HCC); Cataract; Depression; Glaucoma; Hypertension; IBD (inflammatory bowel disease); Muscle disorder; and Stroke ([facility]).    ROS   No chest pain, no shortness of breath, no abdominal pain.     Objective:     Physical Exam:  Blood pressure 102/60, pulse 74, temperature 36.1 °C (97 °F), resp. rate 18, height 1.638 m (5' 4.5\"), weight 62.1 kg (137 lb), SpO2 97 %. Body mass index is 23.15 kg/m².   Constitutional: Alert, no distress, elderly male in no acute distress.  Skin: Warm, dry, good turgor, no rashes in visible areas.  Eye: Equal, " round and reactive, conjunctiva clear, lids normal.  ENMT: Lips without lesions, good dentition, oropharynx clear.  Neck: Trachea midline, no masses, no thyromegaly.   Respiratory: Unlabored respiratory effort, lungs clear to auscultation, no wheezes, no rhonchi.  Cardiovascular: Normal S1, S2, no murmur, no edema.  Abdomen: Soft, non-tender, no masses, no hepatosplenomegaly.  MSK: Slow and steady gait with rollator.  Psych: Alert and oriented x3, normal affect and mood.    Assessment and Plan:     1. Other glaucoma of both eyes  Improving.  Continue follow-up with ophthalmologist and eyedrops.    2. Stage 3 chronic kidney disease (HCC)  Chronic and stable.  Likely as we discontinued many of his medications.  We will follow-up in a few months.  If this test is stable, then we will go to annual blood work.  - BASIC METABOLIC PANEL; Future    3. Coronary artery disease involving native coronary artery of native heart without angina pectoris  4. History of hypertension  5. History of cerebrovascular accident (CVA) with residual deficit  Chronic and stable off medications.  Continue to monitor.    6. Late onset Alzheimer's disease without behavioral disturbance  7. Palliative care status  Chronic and stable.  Patient is living in assisted living facility.  We will continue to avoid unnecessary medications.  Continue to monitor.    8. Physician orders for life-sustaining treatment (POLST) form indicates patient wish for do-not-resuscitate status  We all agree that Jt should be a DNR.  Patient has capacity to make his own decisions.  POLST form updated and scanned into chart.    Followup: Return in about 5 months (around 7/19/2019) for AWV, follow-up kidney labs, short.         PLEASE NOTE: This dictation was created using voice recognition software. I have made every reasonable attempt to correct obvious errors, but I expect that there are errors of grammar and possibly content that I did not discover before  finalizing the note.

## 2019-02-06 NOTE — PATIENT INSTRUCTIONS
1. Come back in July for a Wellness Visit  2. Have kidney test done a week before appointment  3. Jt is doing well!

## 2019-07-19 NOTE — PROGRESS NOTES
Chief Complaint   Patient presents with   • Annual Wellness Visit       HPI:  Jt Arevalo is a 85 y.o. here for Medicare Annual Wellness Visit. He is accompanied by his nephew (Boaz) and his niece-in-law (Nohemi).     Patient's blood pressure is elevated at 170/80 today. We had weaned him off of his antihypertensive medications last year. This is the first visit in some time that his blood pressure is not at goal. Patient denies any chest pain, shortness of breath, headache, lightheadedness, or leg swelling since being taken off of his medications. He has had significant dental work done recently.    Additionally, Jt has lost about 10 lbs since our visit last year as well as 6 lbs since our visit in February. His family members mention that he tends to only eat breakfast and lunch, and that he often refuses dinner. They plan to begin encouraging Jt to drink Ensure protein shakes regularly.     Boaz and Nohemi tell me that they have noticed a decline in Jt's memory recently. They state he remains pleasant and is still aware of where he lives. Boaz states that he takes Jt to [facility] once per month to la and spend time together in order to keep him active.     Patient Active Problem List    Diagnosis Date Noted   • Palliative care status 02/06/2019   • Stage 3 chronic kidney disease (HCC) 02/06/2019   • Other specified glaucoma 04/04/2018   • Pure hypercholesterolemia 05/16/2017   • History of prostate cancer 04/17/2017   • Essential hypertension 04/17/2017   • History of gout 04/17/2017   • Coronary artery disease involving native coronary artery of native heart without angina pectoris 04/17/2017   • Late onset Alzheimer's disease without behavioral disturbance 04/17/2017   • History of cerebrovascular accident (CVA) with residual deficit 04/17/2017       Current Outpatient Prescriptions   Medication Sig Dispense Refill   • amLODIPine (NORVASC) 5 MG Tab Take 1 Tab by mouth every  day. 30 Tab 2   • latanoprost (XALATAN) 0.005 % Solution PLACE 1 DROP IN EACH EYE DAILY 2.5 mL 11   • SIMBRINZA 1-0.2 % Suspension 1 Drop by Ophthalmic route 3 times a day.  3   • aspirin EC (ECOTRIN) 81 MG Tablet Delayed Response Take 81 mg by mouth every day.       No current facility-administered medications for this visit.           Current supplements as per medication list.     Allergies: Patient has no known allergies.    Current social contact/activities: Going to the [employer] once monthly     He  reports that he quit smoking about 28 years ago. He has never used smokeless tobacco. He reports that he drinks about 0.6 oz of alcohol per week . He reports that he does not use drugs.  Counseling given: Not Answered    DPA/Advanced Directive:  Patient has Advanced Directive on file.  Also has POLST and DNR order.    ROS:    Gait: Uses a walker  Ostomy: No  Other tubes: No  Amputations: No  Chronic oxygen use: No  Last eye exam:  - last week   Wears hearing aids: Yes   : Denies any urinary leakage during the last 6 months - wears Depends daily     Screening:    Depression Screening  Little interest or pleasure in doing things?  0 - not at all  Feeling down, depressed , or hopeless? 0 - not at all  Patient Health Questionnaire Score: 0     If depressive symptoms identified deferred to follow up visit unless specifically addressed in assessment and plan.    Interpretation of PHQ-9 Total Score   Score Severity   1-4 No Depression   5-9 Mild Depression   10-14 Moderate Depression   15-19 Moderately Severe Depression   20-27 Severe Depression    Screening for Cognitive Impairment  Three Minute Recall (village, kitchen, baby) 1/3    [place] clock face with all 12 numbers and set the hands to show 10 past 10.  No    Cognitive concerns identified deferred for follow up unless specifically addressed in assessment and plan.    Fall Risk Assessment  Has the patient had two or more falls in the last year or any fall  with injury in the last year?  No    Safety Assessment  Throw rugs on floor.  No  Handrails on all stairs.  Yes  Good lighting in all hallways.  Yes  Difficulty hearing.  Yes  Patient counseled about all safety risks that were identified.    Functional Assessment ADLs  Are there any barriers preventing you from cooking for yourself or meeting nutritional needs?  No.    Are there any barriers preventing you from driving safely or obtaining transportation?  No.    Are there any barriers preventing you from using a telephone or calling for help?  No.    Are there any barriers preventing you from shopping?  No.    Are there any barriers preventing you from taking care of your own finances?  Yes. Family takes care of finances  Are there any barriers preventing you from managing your medications?  No.    Are there any barriers preventing you from showering, bathing or dressing yourself?  Yes. Assistance from living facility   Are you currently engaging in any exercise or physical activity?  No.     What is your perception of your health?  Good.    Health Maintenance Summary                Annual Wellness Visit Overdue 12/14/1933     IMM ZOSTER VACCINES Postponed 7/31/2020 Originally 12/14/1983. System: vaccine not available, other system reasons    IMM INFLUENZA Next Due 9/1/2019      Done 10/18/2018 Imm Admin: Influenza Vaccine Adult HD     Patient has more history with this topic...    IMM DTaP/Tdap/Td Vaccine Next Due 8/25/2024      Done 8/25/2014 Imm Admin: Tdap Vaccine        Patient Care Team:  Kathrine Melgoza M.D. as PCP - General (Family Medicine)  Cirstiano Leach M.D. as Consulting Physician (Ophthalmology)    Social History   Substance Use Topics   • Smoking status: Former Smoker     Quit date: 4/6/1991   • Smokeless tobacco: Never Used   • Alcohol use 0.6 oz/week     1 Shots of liquor per week      Comment: rare     Family History   Problem Relation Age of Onset   • No Known Problems Mother    • No Known  "Problems Father    • No Known Problems Sister    • No Known Problems Brother    • No Known Problems Brother      He  has a past medical history of Anxiety; Arthritis; Cancer (HCC); Cataract; Depression; Glaucoma; Hypertension; IBD (inflammatory bowel disease); Muscle disorder; and Stroke (HCC).   Past Surgical History:   Procedure Laterality Date   • EYE SURGERY      bilateral cataracts   • PROSTATECTOMY, RADICAL RETRO      seed implants   • STENT PLACEMENT         Exam:   BP (!) 170/80 (BP Location: Left arm, Patient Position: Sitting, BP Cuff Size: Adult)   Pulse 68   Temp 36.7 °C (98 °F) (Temporal)   Resp 16   Ht 1.638 m (5' 4.5\")   Wt 59.8 kg (131 lb 12.8 oz)   SpO2 95%  Body mass index is 22.27 kg/m².  Constitutional: Alert, no distress.  Skin: Warm, dry, good turgor, no rashes in visible areas.  Eye: Equal, round and reactive, conjunctiva clear, lids normal.  ENMT: Lips without lesions, good dentition, oropharynx clear.  Neck: Trachea midline, no masses, no thyromegaly. No cervical or supraclavicular lymphadenopathy.  Respiratory: Unlabored respiratory effort, lungs clear to auscultation, no wheezes, no ronchi.  Cardiovascular: Normal S1, S2, no murmur, no edema.  Psych: Alert and oriented x3, normal affect and mood.    Hearing poor.  Dentition fair  Alert, oriented in no acute distress.  Eye contact is good, speech goal directed, affect calm    Hospital Outpatient Visit on 07/05/2019   Component Date Value Ref Range Status   • Sodium 07/05/2019 142  135 - 145 mmol/L Final   • Potassium 07/05/2019 4.8  3.6 - 5.5 mmol/L Final   • Chloride 07/05/2019 108  96 - 112 mmol/L Final   • Co2 07/05/2019 26  20 - 33 mmol/L Final   • Glucose 07/05/2019 83  65 - 99 mg/dL Final   • Bun 07/05/2019 20  8 - 22 mg/dL Final   • Creatinine 07/05/2019 1.28  0.50 - 1.40 mg/dL Final   • Calcium 07/05/2019 9.3  8.5 - 10.5 mg/dL Final   • Anion Gap 07/05/2019 8.0  0.0 - 11.9 Final   • GFR If  07/05/2019 >60  >60 " mL/min/1.73 m 2 Final   • GFR If Non  07/05/2019 53* >60 mL/min/1.73 m 2 Final     Assessment and Plan. The following treatment and monitoring plan is recommended:    1. Medicare annual wellness visit, initial  HRA reviewed and appropriate.  Patient's previous medical history, healthcare maintenance and immunization status reviewed.  See discussion of anticipatory guidance and individual problems below.  Patient will return annually for Medicare annual well visit.    2. Essential hypertension  Chronic and worsening. Patient's blood pressure is elevated in office today at 170/80. Unclear if recent dental procedures or pain may be contribuiting. We have discussed placing him on a low dose antihypertensive medication in order to better regulate his blood pressure. Continue to monitor.   amLODIPine (NORVASC) 5 MG Tab   3. History of cerebrovascular accident (CVA) with residual deficit  Plan same as #2 above.    4. Late onset Alzheimer's disease without behavioral disturbance  Chronic and stable.  Patient is living in assisted living facility.  We will continue to avoid unnecessary medications.  Continue to monitor   5. Coronary artery disease involving native coronary artery of native heart without angina pectoris  Plan same as #2.    6. Stage 3 chronic kidney disease (HCC)  Chronic and stable. Reviewed recent labs with patient. We will continue to monitor annually.    7. Palliative care status  Plan same as #4.      Services suggested: No services needed at this time  Health Care Screening: Age-appropriate preventive services recommended by USPTF and ACIP covered by Medicare were discussed today. Services ordered if indicated and agreed upon by the patient.  Referrals offered: Community-based lifestyle interventions to reduce health risks and promote self-management and wellness, fall prevention, nutrition, physical activity, tobacco-use cessation, weight loss, and mental health services as per orders  if indicated.    Discussion today about general wellness and lifestyle habits:    · Prevent falls and reduce trip hazards; Cautioned about securing or removing rugs.  · Have a working fire alarm and carbon monoxide detector;   · Engage in regular physical activity and social activities     Follow-up: Return in about 6 weeks (around 8/30/2019) for HTN, Short.

## 2019-09-03 NOTE — TELEPHONE ENCOUNTER
1. Caller Name: Maria Luz Fernandes                         Call Back Number: [phone]    Maria Luz called for patient. Patient has been out of rx since 08/19/19. Pharmacy didn't fill it. Let maria luz know there was 2 refill for him. Maria Luz wants to know if he needs tomorrows apt. If not when would you like patient to come back in? Please Advise. LM

## 2019-09-03 NOTE — TELEPHONE ENCOUNTER
Was the patient seen in the last year in this department? Yes    Does patient have an active prescription for medications requested? No     Received Request Via: Patient     Currently out. LM

## 2019-09-03 NOTE — TELEPHONE ENCOUNTER
Phone Number Called: [phone] (home)     Call outcome: Spoke to Nohemi    Message: Nohemi understood message she or Boaz will call back to make an appointment this month [place] BP check. 9/4/19 appt is cxled

## 2019-09-03 NOTE — TELEPHONE ENCOUNTER
Phone Number Called: [phone] (home)     Call outcome: Unable to leave message     Message: Patient needs MA visit in order to cancel tomorrows visit

## 2019-09-03 NOTE — TELEPHONE ENCOUNTER
I would like him to come in some time in the next few weeks for an MA visit to recheck his blood pressure now that we have him back on his amlodipine.  It is okay to cancel the appointment with me as long as he has an MA visit sometime this month.  -Kathrine Melgoza M.D.

## 2019-09-26 NOTE — PROGRESS NOTES
Jt Arevalo is a 85 y.o. male here for a non-provider visit for Blood Pressure check     Vitals:    09/26/19 1100   BP: 110/68   BP Location: Left arm   Patient Position: Sitting   BP Cuff Size: Adult     If abnormal, was an in office provider notified today? Yes  Routed to PCP? No

## 2019-09-28 NOTE — PROGRESS NOTES
"SUBJECTIVE      Chief Complaint   Patient presents with   • Ingrown Toenail     infection, L 2 toe              Left toe pain    This is a new problem. The problem has been gradually worsening since onset, approximately 3 wks ago. Pain location:  left 2nd toe.   There is also some redness around the toe.     The area is characterized by redness, swelling and pain.  Pain is constant and throbbing, worse with wtbearing.     Pertinent negatives include no congestion, cough, fatigue, fever or shortness of breath. Past treatments include nothing.     History   Substance Use Topics   • Smoking status: Never Smoker    • Smokeless tobacco: No    • Alcohol Use: No      Past medical history - CVA, HLD, CAD    Family History   Problem Relation Age of Onset   • No Known Problems Mother    • No Known Problems Father    • No Known Problems Sister    • No Known Problems Brother    • No Known Problems Brother              Review of Systems   Constitutional: Negative for fever, chills and malaise/fatigue.   Eyes: Negative for vision changes, d/c.    Respiratory: Negative for cough and sputum production.    Cardiovascular: Negative for chest pain and palpitations.   Gastrointestinal: Negative for nausea, vomiting, abdominal pain, diarrhea and constipation.   Genitourinary: Negative for dysuria, urgency and frequency.   Skin: Negative for  itching.   Neurological: Negative for dizziness and tingling.   Psychiatric/Behavioral: Negative for depression.   Hematologic/lymphatic - denies bruising or excessive bleeding  All other systems reviewed and are negative.       Objective:     /70   Pulse 69   Temp 36.6 °C (97.9 °F) (Temporal)   Resp 14   Ht 1.638 m (5' 4.5\")   Wt 59.4 kg (131 lb)   SpO2 97%       Physical Exam   Constitutional: pt is oriented to person, place, and time. Pt appears well-developed and well-nourished. No distress.   HENT:   Head: Normocephalic and atraumatic.   Eyes: Conjunctivae are normal. No scleral " icterus.   Cardiovascular: Normal rate and regular rhythm.    Pulmonary/Chest: Effort normal and breath sounds normal. No respiratory distress.        Neurological: pt is alert and oriented to person, place, and time. No cranial nerve deficit.   Skin: Skin is warm. Pt is not diaphoretic.      LEFT SECOND TOE  There is a small area  of erythema, warmth, tenderness to touch that is localized to the 2nd toe.   No red streaking up foot or leg.    No crepitus, bullae or edema outside the border of erythema.   Brisk cap refill.   There is a shallow pressure ulcer on the ventral surface of the distal toe      Nursing note and vitals reviewed.              Assessment/Plan:     1. Cellulitis of left lower extremity     - sulfamethoxazole-trimethoprim (BACTRIM DS) 800-160 MG tablet; Take 1 Tab by mouth 2 times a day for 7 days.  Dispense: 14 Tab; Refill: 0  Referred to wound care for the pressure ulcer  Advised to wear properly fitted shoes - not too tight.     - REFERRAL TO WOUND CLINIC

## 2019-10-07 NOTE — CERTIFICATION
"Non Provider Encounter- Full Thickness wound    HISTORY OF PRESENT ILLNESS        START OF CARE IN CLINIC: 10/07/2019    REFERRING PROVIDER: Jose Garcia MD     WOUND- Full thickness wound   LOCATION: Left distal 2nd toe - hammertoe   WOUND HISTORY: Patient is an 85 year old male who resides at [facility] living. He is hard of hearing despite hearing aids which makes it difficult to obtain a clear history of wound. He does state that about 2-3 weeks ago \"someone from the facility\" cut off his toenail on his left 2nd toe, and cut \"too deep\". He was prescribed Bactrim from his PCP which he completed 10/5/19. There is a mild foul odor to the wound today, so a wound culture was taken. He has been covering the wound with bandaids and changing daily.      PERTINENT PMH: HTN, gout, CKD III      IMAGING:None   VASCULAR STUDIES:None - ordered arterial studies 10/7/19. Cannot palpate DP/PT pulses on either foot. Unable to locate pulses with the doppler. Feet are cold to the touch and there is little hair growth. Pt reports intermittent pain to his calves.      LAST  WOUND CULTURE:  DATE: N/A - obtained 10/7/19                DIABETES: No    TOBACCO USE: Denies    RESULTS: No pertinent results in Epic    CLINIC NARGIS: Unable to locate pulses with doppler or with palpation. Studies ordered.     FALL RISK ASSESSMENT:   x65 years or older     xFall within the last 2 years   xUses ambulatory devices  Loss of protective sensation in feet   Use of prostethic/orthotic    Presence of lower extremity/foot/toe amputation   Taking medication that increases risk (per facility policy)    Interventions Recommended (if any of the above are selected):   Use of Assistive Device: Walks with a walker.    Supervision with ambulation: Caregiver   Assistance with ambulation: Caregiver   Home safety education: Educational material provided    PAST MEDICAL HISTORY:   Past Medical History:   Diagnosis Date   • Anxiety    • Arthritis     " bilateral hips  and other verious joints   • Cancer (HCC)     prostate   • Cataract    • Depression    • Glaucoma     bilateral    • Hypertension    • IBD (inflammatory bowel disease)     IBS   • Muscle disorder     pt on Neurontin   • Stroke (HCC)     speech impairment     PAST SURGICAL HISTORY:   Past Surgical History:   Procedure Laterality Date   • EYE SURGERY      bilateral cataracts   • PROSTATECTOMY, RADICAL RETRO      seed implants   • STENT PLACEMENT        MEDICATIONS:   Current Outpatient Medications   Medication   • amLODIPine (NORVASC) 5 MG Tab   • latanoprost (XALATAN) 0.005 % Solution   • SIMBRINZA 1-0.2 % Suspension   • aspirin EC (ECOTRIN) 81 MG Tablet Delayed Response     No current facility-administered medications for this visit.      ALLERGIES:  No Known Allergies    SOCIAL HISTORY:   Social History     Socioeconomic History   • Marital status:      Spouse name: Not on file   • Number of children: Not on file   • Years of education: Not on file   • Highest education level: Not on file   Occupational History   • Not on file   Social Needs   • Financial resource strain: Not on file   • Food insecurity:     Worry: Not on file     Inability: Not on file   • Transportation needs:     Medical: Not on file     Non-medical: Not on file   Tobacco Use   • Smoking status: Former Smoker     Last attempt to quit: 1991     Years since quittin.5   • Smokeless tobacco: Never Used   Substance and Sexual Activity   • Alcohol use: Yes     Alcohol/week: 0.6 oz     Types: 1 Shots of liquor per week     Comment: rare   • Drug use: No   • Sexual activity: Never     Birth control/protection: Condom   Lifestyle   • Physical activity:     Days per week: Not on file     Minutes per session: Not on file   • Stress: Not on file   Relationships   • Social connections:     Talks on phone: Not on file     Gets together: Not on file     Attends Spiritism service: Not on file     Active member of club or  organization: Not on file     Attends meetings of clubs or organizations: Not on file     Relationship status: Not on file   • Intimate partner violence:     Fear of current or ex partner: Not on file     Emotionally abused: Not on file     Physically abused: Not on file     Forced sexual activity: Not on file   Other Topics Concern   • Not on file   Social History Narrative   • Not on file     Wound Assessment:  Wound 10/07/19 Toe (Comment which one) Left distal 2nd toe (Active)   Wound Image   10/7/2019 10:00 AM   Site Assessment Red;White 10/7/2019 10:00 AM   Marion-wound Assessment Maceration 10/7/2019 10:00 AM   Margins Attached edges 10/7/2019 10:00 AM   Post Wound Length (cm) 1 cm 10/7/2019 10:00 AM    Post Wound Width (cm) 1 cm 10/7/2019 10:00 AM   Post Wound Depth (cm) 0.1 cm 10/7/2019 10:00 AM   Post Wound Surface Area (cm^2) 1 cm^2 10/7/2019 10:00 AM   Tunneling 0 cm 10/7/2019 10:00 AM   Undermining 0 cm 10/7/2019 10:00 AM   Drainage Amount Moderate 10/7/2019 10:00 AM   Drainage Description Serosanguineous 10/7/2019 10:00 AM   Non-staged Wound Description Full thickness 10/7/2019 10:00 AM   Treatments Cleansed;Pharmaceutical agent 10/7/2019 10:00 AM   Cleansing Normal Saline Irrigation 10/7/2019 10:00 AM   Periwound Protectant Skin Protectant wipes to Periwound 10/7/2019 10:00 AM   Dressing Options Hydrofiber Silver;Dry Gauze;Hypafix Tape 10/7/2019 10:00 AM   Dressing Changed New 10/7/2019 10:00 AM   Dressing Status Dry;Clean;Intact 10/7/2019 10:00 AM   Dressing Change Frequency Weekly 10/7/2019 10:00 AM   NEXT Dressing Change  10/14/19 10/7/2019 10:00 AM   WOUND NURSE ONLY - Odor Mild;Foul 10/7/2019 10:00 AM   WOUND NURSE ONLY - Pulses Not palpable 10/7/2019 10:00 AM   WOUND NURSE ONLY - Exposed Structures None 10/7/2019 10:00 AM   WOUND NURSE ONLY - Tissue Type and Percentage 25% white; 75% red 10/7/2019 10:00 AM     Procedures:     Debridement: Non selective debridement with NS and gauze to remove non  viable tissue from wound bed.      Cleansed with:    NS                                                                      Periwound protected with: Skin prep   Primary dressing: See flow sheets for dressing selection    Post-debridement Photo        PATIENT EDUCATION  -Advised to go to ER for any increased redness, swelling, drainage or odor, or if patient develops fever, chills, nausea or vomiting.  -Importance of adequate nutrition for wound healing  -Increase protein intake (unless contraindicated by renal status)

## 2019-10-09 NOTE — PROGRESS NOTES
Positive wound culture for Klebsiella to left second toe.  Patient recently finished a 7-day course of Bactrim DS prescribed by .   Reviewed photos, medications, allergies, labs.  Creatinine clearance calculated to be 35 ml/min.  Contacted Daniel at [facility].  Asked that BMP be completed today or tomorrow.  New Rx for Bactrim DS for 10 days sent to pharmacy on file.  Next appointment 10/16/2019.  Evaluate for LPS rounds For edgardo correction.

## 2019-10-16 NOTE — PROGRESS NOTES
"Provider Encounter- Full Thickness wound    HISTORY OF PRESENT ILLNESS                              START OF CARE IN CLINIC: 10/07/2019               REFERRING PROVIDER: Jose Garcia MD                 WOUND- Full thickness wound              LOCATION: Left distal 2nd toe - hammertoe            Left medial 4th toe and 4-5 interdigital space-first noted in clinic on 10/16/2019                WOUND HISTORY: Patient is an 85 year old male who resides at [facility]. He is hard of hearing despite hearing aids which makes it difficult to obtain a clear history of wound, also a poor historian. He does state that  \"someone from the facility\" cut off his toenail on his left 2nd toe, and cut \"too deep\". .  Wound culture taken in clinic on initial visit, positive for heavy growth of Klebsiella oxytoca. He was prescribed a 10-day course of Bactrim DS on 10/9/2019.                 PERTINENT PMH: HTN, gout, CKD III                  IMAGING:None              VASCULAR STUDIES:None - ordered arterial studies 10/7/19. Cannot palpate DP/PT pulses on either foot. Unable to locate pulses with the doppler. Feet are cold to the touch and there is little hair growth. Pt reports intermittent pain to his calves.                                      LAST  WOUND CULTURE:  DATE: N/A - obtained 10/7/19                           DIABETES: No     TOBACCO USE: Denies      10/16/2019 : Initial provider visit with LANEY Bean.  Patient is feeling well, denies fever, chills, nausea, vomiting.  Patient presents today company by caregiver from [facility].  He is a very poor historian, so obtaining history of these wounds very difficult.  A new wound was discovered to his medial left fourth toe and interdigital space.  Unable to palpate pulses.  Arterial studies ordered, x-ray ordered.  No debridement due to uncertainty of lower extremity perfusion.  Rx for offloading shoe.       RESULTS: No pertinent results in Epic     CLINIC " NARGIS: Unable to locate pulses with doppler or with palpation. Studies ordered.             PAST MEDICAL HISTORY:   Past Medical History:   Diagnosis Date   • Anxiety    • Arthritis     bilateral hips  and other verious joints   • Cancer (HCC)     prostate   • Cataract    • Depression    • Glaucoma     bilateral    • Hypertension    • IBD (inflammatory bowel disease)     IBS   • Muscle disorder     pt on Neurontin   • Stroke (HCC)     speech impairment       PAST SURGICAL HISTORY:   Past Surgical History:   Procedure Laterality Date   • EYE SURGERY      bilateral cataracts   • PROSTATECTOMY, RADICAL RETRO      seed implants   • STENT PLACEMENT          MEDICATIONS:   Current Outpatient Medications   Medication   • sulfamethoxazole-trimethoprim (BACTRIM DS) 800-160 MG tablet   • amLODIPine (NORVASC) 5 MG Tab   • latanoprost (XALATAN) 0.005 % Solution   • SIMBRINZA 1-0.2 % Suspension   • aspirin EC (ECOTRIN) 81 MG Tablet Delayed Response     No current facility-administered medications for this visit.        ALLERGIES:  No Known Allergies      SOCIAL HISTORY:   Social History     Socioeconomic History   • Marital status:      Spouse name: Not on file   • Number of children: Not on file   • Years of education: Not on file   • Highest education level: Not on file   Occupational History   • Not on file   Social Needs   • Financial resource strain: Not on file   • Food insecurity:     Worry: Not on file     Inability: Not on file   • Transportation needs:     Medical: Not on file     Non-medical: Not on file   Tobacco Use   • Smoking status: Former Smoker     Last attempt to quit: 1991     Years since quittin.5   • Smokeless tobacco: Never Used   Substance and Sexual Activity   • Alcohol use: Yes     Alcohol/week: 0.6 oz     Types: 1 Shots of liquor per week     Comment: rare   • Drug use: No   • Sexual activity: Never     Birth control/protection: Condom   Lifestyle   • Physical activity:     Days per week:  Not on file     Minutes per session: Not on file   • Stress: Not on file   Relationships   • Social connections:     Talks on phone: Not on file     Gets together: Not on file     Attends Islam service: Not on file     Active member of club or organization: Not on file     Attends meetings of clubs or organizations: Not on file     Relationship status: Not on file   • Intimate partner violence:     Fear of current or ex partner: Not on file     Emotionally abused: Not on file     Physically abused: Not on file     Forced sexual activity: Not on file   Other Topics Concern   • Not on file   Social History Narrative   • Not on file       FAMILY HISTORY:   Family History   Problem Relation Age of Onset   • No Known Problems Mother    • No Known Problems Father    • No Known Problems Sister    • No Known Problems Brother    • No Known Problems Brother         REVIEW OF SYSTEMS:   Review of Systems   Constitutional: Negative for chills and fever.   HENT: Positive for hearing loss.    Respiratory: Negative for cough and shortness of breath.    Cardiovascular: Negative for chest pain, claudication and leg swelling.   Gastrointestinal: Negative for abdominal pain, constipation, diarrhea, nausea and vomiting.   Musculoskeletal: Negative for joint pain.   Neurological:        Numbness in both feet       PHYSICAL EXAMINATION:   BP (!) 181/77   Pulse 75   Temp 36.6 °C (97.9 °F) (Temporal)   Resp 18   SpO2 96%   Physical Exam   Constitutional: He is well-developed, well-nourished, and in no distress.   Elderly, frail male   HENT:   Head: Normocephalic.   Cardiovascular:   Unable to palpate pedal pulses, or locate with Doppler   Pulmonary/Chest: Effort normal.   Musculoskeletal: He exhibits no edema.   Neurological: He is alert.   Poor historian   Skin: Skin is warm.   Full-thickness wounds to left distal second toe, and left medial fourth toe and interdigital space  Refer to wound flowsheet   Psychiatric: Affect and  judgment normal.       Wound Assessment    Wound 10/07/19 Toe (Comment which one) Left distal 2nd toe (Active)   Wound Image   10/16/2019  8:30 AM   Site Assessment Red;Yellow 10/16/2019  8:30 AM   Marion-wound Assessment Intact 10/16/2019  8:30 AM   Margins CHARANJIT 10/16/2019  8:30 AM   Post Wound Length (cm) 1.4 cm 10/16/2019  8:30 AM    Post Wound Width (cm) 1 cm 10/16/2019  8:30 AM   Post Wound Depth (cm) 0.3 cm 10/16/2019  8:30 AM   Post Wound Surface Area (cm^2) 1.4 cm^2 10/16/2019  8:30 AM   Tunneling 0 cm 10/7/2019 10:00 AM   Undermining 0 cm 10/7/2019 10:00 AM   Drainage Amount Moderate 10/16/2019  8:30 AM   Drainage Description Serosanguineous 10/16/2019  8:30 AM   Non-staged Wound Description Full thickness 10/16/2019  8:30 AM   Treatments Cleansed;Pharmaceutical agent 10/16/2019  8:30 AM   Cleansing Normal Saline Irrigation 10/16/2019  8:30 AM   Periwound Protectant Skin Protectant wipes to Periwound 10/16/2019  8:30 AM   Dressing Options Hydrofiber Silver;Dry Gauze;Hypafix Tape 10/16/2019  8:30 AM   Dressing Changed Changed 10/16/2019  8:30 AM   Dressing Status Dry;Clean;Intact 10/7/2019 10:00 AM   Dressing Change Frequency Weekly 10/7/2019 10:00 AM   NEXT Dressing Change  10/14/19 10/7/2019 10:00 AM   WOUND NURSE ONLY - Odor None 10/16/2019  8:30 AM   WOUND NURSE ONLY - Pulses Not palpable 10/16/2019  8:30 AM   WOUND NURSE ONLY - Exposed Structures Other (Comments) 10/16/2019  8:30 AM   WOUND NURSE ONLY - Tissue Type and Percentage 5% red moist, 95% yellow adherent. 10/16/2019  8:30 AM       Wound 10/16/19 Full Thickness Wound Toe (Comment which one) --Left Medial 4th Toe (Active)   Wound Image   10/16/2019  8:30 AM   Site Assessment Red;Yellow 10/16/2019  8:30 AM   Marion-wound Assessment Intact 10/16/2019  8:30 AM   Margins CHARANJIT 10/16/2019  8:30 AM   Post Wound Length (cm) 1.6 cm 10/16/2019  8:30 AM    Post Wound Width (cm) 1.5 cm 10/16/2019  8:30 AM   Post Wound Surface Area (cm^2) 2.4 cm^2 10/16/2019  8:30  AM   Drainage Amount Other (Comment) 10/16/2019  8:30 AM   Non-staged Wound Description Full thickness 10/16/2019  8:30 AM   Treatments Cleansed 10/16/2019  8:30 AM   Cleansing Normal Saline Irrigation 10/16/2019  8:30 AM   Periwound Protectant Skin Protectant wipes to Periwound 10/16/2019  8:30 AM   Dressing Options Hydrofiber Silver;Dry Gauze;Hypafix Tape 10/16/2019  8:30 AM   Dressing Changed New 10/16/2019  8:30 AM   WOUND NURSE ONLY - Odor None 10/16/2019  8:30 AM   WOUND NURSE ONLY - Pulses Not palpable 10/16/2019  8:30 AM   WOUND NURSE ONLY - Exposed Structures Other (Comments) 10/16/2019  8:30 AM   WOUND NURSE ONLY - Tissue Type and Percentage 5% red moist, 95% yellow adherent. 10/16/2019  8:30 AM          Wound photos, no debridement              PROCEDURE     -Wound care completed by Dang Mantilla  RN                PATIENT EDUCATION  -Advised to go to ER for any increased redness, swelling, drainage or odor, or if patient develops fever, chills, nausea or vomiting.  -Importance of adequate nutrition for wound healing  -Increase protein intake (unless contraindicated by renal status)    ASSESSMENT AND PLAN:     1. Skin ulcer of toe of left foot with fat layer exposed (HCC)  Comments: Patient originally sent to clinic for full-thickness ulcer to distal left second toe.  New wound discovered in clinic on 10/16, medial left fourth toe and interdigital space.    10/16: Initial provider visit  -No debridement of these wounds due to uncertainty of lower extremity perfusion  -X-ray of toes ordered  -LPS rounds once x-ray and arterial studies completed    2. Decreased pedal pulses    10/16: Unable to palpate or Doppler pedal pulses  -Arterial studies ordered, bilateral      3. Pain of toe of left foot    -2% viscous lidocaine applied topically to wound bed for approximately 5 minutes prior to wound care  -Patient tolerated procedure today with no complaints of discomfort.    4. Toe infection    10/16: Wound  culture from 10/7+ for Klebsiella oxytoca  -Patient is currently on Bactrim DS   -Consider ID referral      Patient was seen for 30 minutes face to face of which > 50% of appointment time was spent on counseling and coordination of care regarding the above.      Please note that this dictation was created using voice recognition software. I have worked with technical experts from [facility] to optimize the interface.  I have made every reasonable attempt to correct obvious errors, but there may be errors of grammar and possibly content that I did not discover before finalizing the note.

## 2019-10-16 NOTE — PATIENT INSTRUCTIONS
-Keep dressings clean, dry and covered while bathing. Only change dressings if they become over saturated, soiled or fall off.     -Your xray can be completed on a walk-in basis only.    -You can schedule your arterial studies by calling Imaging Scheduling at [phone].     -Should you experience any significant changes in your wound(s), such as infection (redness, swelling, localized heat, increased pain, fever > 101 F, chills) or have any questions regarding your home care instructions, please contact the wound center at [phone]. If after hours, contact your primary care physician or go to the hospital emergency room.

## 2019-10-22 NOTE — PATIENT INSTRUCTIONS
Reviewed POC, follow up with vascular surgeon for next clinic visit, importance of keeping the secondary dressing dry and intact, offloading (pt provided script for offloading shoes at [facility]), s/s of complications/infection, when to notify MD/go to ER.  Pt verbalized understanding to all.

## 2019-10-22 NOTE — PROGRESS NOTES
"Provider Encounter- Full Thickness wound    HISTORY OF PRESENT ILLNESS                              START OF CARE IN CLINIC: 10/07/2019               REFERRING PROVIDER: Jose Garcia MD                 WOUND- Full thickness wound              LOCATION: Left distal 2nd toe - hammertoe            Left medial 4th toe and 4-5 interdigital space-first noted in clinic on 10/16/2019                WOUND HISTORY: Patient is an 85 year old male who resides at [facility]. He is hard of hearing despite hearing aids which makes it difficult to obtain a clear history of wound, also a poor historian. He does state that  \"someone from the facility\" cut off his toenail on his left 2nd toe, and cut \"too deep.\"  Wound culture taken in clinic on initial visit, positive for heavy growth of Klebsiella oxytoca. He was prescribed a 10-day course of Bactrim DS on 10/9/2019.                 PERTINENT PMH: HTN, gout, CKD III               IMAGING:None              VASCULAR STUDIES:Ordered arterial studies 10/7/19. Cannot palpate DP/PT pulses on either foot. Unable to locate pulses with the doppler. Feet are cold to the touch and there is little hair growth. Pt reports intermittent pain to his calves.           Arterial study bilateral LE 10/18/19:  1. Severe bilateral peripheral artery disease bilaterally.  2. On the RIGHT, there is occlusion of mid femoral artery, posterior tibial artery, peroneal artery and distal anterior tibial artery.  3. On the LEFT, there is high-grade stenosis versus occlusion of the distal common femoral artery/proximal superficial femoral artery. Additionally, there is occlusion of posterior tibial artery and proximal peroneal artery.  4. ABIs were not obtained, as ankle pressures were not measurable.                                 LAST  WOUND CULTURE:  DATE: 10/7/19 +Klebsiella oxytoca- treated with Bactrim by LANEY Thornton                DIABETES: No     TOBACCO USE: Denies    Results: "   Results for HECTOR FUENTES (MRN [id number]) as of 10/22/2019 09:36   Ref. Range 10/18/2019 12:30   Sodium Latest Ref Range: 135 - 145 mmol/L 136   Potassium Latest Ref Range: 3.6 - 5.5 mmol/L 4.8   Chloride Latest Ref Range: 96 - 112 mmol/L 109   Co2 Latest Ref Range: 20 - 33 mmol/L 17 (L)   Anion Gap Latest Ref Range: 0.0 - 11.9  10.0   Glucose Latest Ref Range: 65 - 99 mg/dL 59 (L)   Bun Latest Ref Range: 8 - 22 mg/dL 30 (H)   Creatinine Latest Ref Range: 0.50 - 1.40 mg/dL 1.57 (H)   GFR If  Latest Ref Range: >60 mL/min/1.73 m 2 51 (A)   GFR If Non  Latest Ref Range: >60 mL/min/1.73 m 2 42 (A)   Calcium Latest Ref Range: 8.5 - 10.5 mg/dL 9.4       10/16/2019 : Initial provider visit with LANEY Bean.  Patient is feeling well, denies fever, chills, nausea, vomiting.  Patient presents today company by caregiver from [place].  He is a very poor historian, so obtaining history of these wounds very difficult.  A new wound was discovered to his medial left fourth toe and interdigital space.  Unable to palpate pulses.  Arterial studies ordered, x-ray ordered.  No debridement due to uncertainty of lower extremity perfusion.  Rx for offloading shoe.    10/22/19: Clinic visit with LANEY Rehman, [facility]. Patient reports feeling well. He is a poor historian.  He is accompanied today by family members. He resides at [facility]. He does report intermittent pain in his calves and feet. Denies wound drainage, odor. Denies erythema, swelling.  Completed Bactrim as prescribed. Denies n/v/d, difficulty urinating, swelling.       PAST MEDICAL HISTORY:   Past Medical History:   Diagnosis Date   • Anxiety    • Arthritis     bilateral hips  and other verious joints   • Cancer (HCC)     prostate   • Cataract    • Depression    • Glaucoma     bilateral    • Hypertension    • IBD (inflammatory bowel disease)     IBS   • Muscle disorder     pt on Neurontin   • Stroke (HCC)      speech impairment       PAST SURGICAL HISTORY:   Past Surgical History:   Procedure Laterality Date   • EYE SURGERY      bilateral cataracts   • PROSTATECTOMY, RADICAL RETRO      seed implants   • STENT PLACEMENT          MEDICATIONS:   Current Outpatient Medications   Medication   • amLODIPine (NORVASC) 5 MG Tab   • latanoprost (XALATAN) 0.005 % Solution   • SIMBRINZA 1-0.2 % Suspension   • aspirin EC (ECOTRIN) 81 MG Tablet Delayed Response     No current facility-administered medications for this visit.        ALLERGIES:  No Known Allergies      SOCIAL HISTORY:   Social History     Socioeconomic History   • Marital status:      Spouse name: Not on file   • Number of children: Not on file   • Years of education: Not on file   • Highest education level: Not on file   Occupational History   • Not on file   Social Needs   • Financial resource strain: Not on file   • Food insecurity:     Worry: Not on file     Inability: Not on file   • Transportation needs:     Medical: Not on file     Non-medical: Not on file   Tobacco Use   • Smoking status: Former Smoker     Last attempt to quit: 1991     Years since quittin.5   • Smokeless tobacco: Never Used   Substance and Sexual Activity   • Alcohol use: Yes     Alcohol/week: 0.6 oz     Types: 1 Shots of liquor per week     Comment: rare   • Drug use: No   • Sexual activity: Never     Birth control/protection: Condom   Lifestyle   • Physical activity:     Days per week: Not on file     Minutes per session: Not on file   • Stress: Not on file   Relationships   • Social connections:     Talks on phone: Not on file     Gets together: Not on file     Attends Jain service: Not on file     Active member of club or organization: Not on file     Attends meetings of clubs or organizations: Not on file     Relationship status: Not on file   • Intimate partner violence:     Fear of current or ex partner: Not on file     Emotionally abused: Not on file     Physically  abused: Not on file     Forced sexual activity: Not on file   Other Topics Concern   • Not on file   Social History Narrative   • Not on file       FAMILY HISTORY:   Family History   Problem Relation Age of Onset   • No Known Problems Mother    • No Known Problems Father    • No Known Problems Sister    • No Known Problems Brother    • No Known Problems Brother         REVIEW OF SYSTEMS:   Review of Systems   Constitutional: Negative for chills and fever.   HENT: Positive for hearing loss.    Respiratory: Negative for cough and shortness of breath.    Cardiovascular: Negative for chest pain, claudication and leg swelling.   Gastrointestinal: Negative for abdominal pain, constipation, diarrhea, nausea and vomiting.   Musculoskeletal: Negative for joint pain.   Neurological:        Numbness in both feet       PHYSICAL EXAMINATION:   BP (!) 163/88   Pulse 75   Temp 35.9 °C (96.6 °F)   SpO2 99%   Physical Exam   Constitutional: He is well-developed, well-nourished, and in no distress.   Elderly, frail male   HENT:   Head: Normocephalic.   Cardiovascular:   Unable to palpate pedal pulses, or locate with Doppler   Pulmonary/Chest: Effort normal.   Musculoskeletal: He exhibits no edema.   Neurological: He is alert.   Poor historian   Skin: Skin is warm.   Full-thickness wounds to left distal second toe, and left medial fourth toe and interdigital space  Skin cool, decreased hair growth  Refer to wound flowsheet   Psychiatric: Affect and judgment normal.       Wound Assessment         Wound 10/07/19 Toe (Comment which one) Left distal 2nd toe (Active)   Wound Image   10/22/2019 10:00 AM   Site Assessment Black;Yellow 10/22/2019 10:00 AM   Marion-wound Assessment Intact;Blanchable erythema;Fragile 10/22/2019 10:00 AM   Margins CHARANJIT 10/22/2019 10:00 AM   Post Wound Length (cm) 1.8 cm 10/22/2019 10:00 AM    Post Wound Width (cm) 1.2 cm 10/22/2019 10:00 AM   Post Wound Depth (cm) 0.3 cm 10/16/2019  8:30 AM   Post Wound Surface  Area (cm^2) 2.16 cm^2 10/22/2019 10:00 AM   Tunneling 0 cm 10/7/2019 10:00 AM   Undermining 0 cm 10/7/2019 10:00 AM   Drainage Amount Small 10/22/2019 10:00 AM   Drainage Description Serosanguineous 10/22/2019 10:00 AM   Non-staged Wound Description Full thickness 10/22/2019 10:00 AM   Treatments Cleansed 10/22/2019 10:00 AM   Cleansing Normal Saline Irrigation 10/22/2019 10:00 AM   Periwound Protectant Skin Protectant wipes to Periwound 10/22/2019 10:00 AM   Dressing Options Other (Comments) 10/22/2019 10:00 AM   Dressing Cleansing/Solutions Normal Saline 10/22/2019 10:00 AM   Dressing Changed New 10/22/2019 10:00 AM   Dressing Status Clean;Dry;Intact 10/22/2019 10:00 AM   Dressing Change Frequency Weekly 10/7/2019 10:00 AM   NEXT Dressing Change  10/14/19 10/7/2019 10:00 AM   WOUND NURSE ONLY - Odor None 10/22/2019 10:00 AM   WOUND NURSE ONLY - Pulses Not palpable 10/22/2019 10:00 AM   WOUND NURSE ONLY - Exposed Structures Other (Comments) 10/22/2019 10:00 AM   WOUND NURSE ONLY - Tissue Type and Percentage 100% yellow, brown, and black 10/22/2019 10:00 AM       Wound 10/16/19 Full Thickness Wound Toe (Comment which one) --Left lateral 4th Toe (Active)   Wound Image   10/22/2019 10:00 AM   Site Assessment Black;Red;Yellow 10/22/2019 10:00 AM   Marion-wound Assessment Intact;Dark edges;Fragile 10/22/2019 10:00 AM   Margins CHARANJIT 10/22/2019 10:00 AM   Post Wound Length (cm) 2.4 cm 10/22/2019 10:00 AM    Post Wound Width (cm) 1.6 cm 10/22/2019 10:00 AM   Post Wound Surface Area (cm^2) 3.84 cm^2 10/22/2019 10:00 AM   Drainage Amount Scant 10/22/2019 10:00 AM   Drainage Description Serosanguineous 10/22/2019 10:00 AM   Non-staged Wound Description Full thickness 10/22/2019 10:00 AM   Treatments Cleansed 10/22/2019 10:00 AM   Cleansing Normal Saline Irrigation 10/22/2019 10:00 AM   Periwound Protectant Skin Protectant wipes to Periwound 10/22/2019 10:00 AM   Dressing Options Other (Comments) 10/22/2019 10:00 AM   Dressing  Cleansing/Solutions Normal Saline 10/22/2019 10:00 AM   Dressing Changed New 10/22/2019 10:00 AM   WOUND NURSE ONLY - Odor None 10/22/2019 10:00 AM   WOUND NURSE ONLY - Pulses Not palpable 10/22/2019 10:00 AM   WOUND NURSE ONLY - Exposed Structures Other (Comments) 10/22/2019 10:00 AM   WOUND NURSE ONLY - Tissue Type and Percentage 5% red, 95% yellow and black 10/22/2019 10:00 AM       Wound 10/22/19 Arterial Ulcer Foot L medial foot 1st MTH (Active)   Wound Image   10/22/2019 10:00 AM   Site Assessment Dry;Black 10/22/2019 10:00 AM   Marion-wound Assessment Intact;Blanchable erythema 10/22/2019 10:00 AM   Wound Length (cm) 0.7 cm 10/22/2019 10:00 AM   Wound Width (cm) 0.7 cm 10/22/2019 10:00 AM   Wound Surface Area (cm^2) 0.49 cm^2 10/22/2019 10:00 AM   Drainage Amount None 10/22/2019 10:00 AM   Non-staged Wound Description Full thickness 10/22/2019 10:00 AM   Treatments Cleansed 10/22/2019 10:00 AM   Cleansing Normal Saline Irrigation 10/22/2019 10:00 AM   Periwound Protectant Skin Protectant wipes to Periwound 10/22/2019 10:00 AM   Dressing Changed New 10/22/2019 10:00 AM   Dressing Change Frequency Other (Comments) 10/22/2019 10:00 AM   WOUND NURSE ONLY - Odor None 10/22/2019 10:00 AM   WOUND NURSE ONLY - Pulses Not palpable 10/22/2019 10:00 AM   WOUND NURSE ONLY - Tissue Type and Percentage 100% dry eschar 10/22/2019 10:00 AM       PROCEDURE: NO DEBRIDEMENT due to PAD     -Wound care completed by  Atiya Alonso,  MELANI, WOCN      PATIENT EDUCATION  -Advised to go to ER for any increased redness, swelling, drainage or odor, or if patient develops fever, chills, nausea or vomiting.  -Importance of adequate nutrition for wound healing  -Increase protein intake (unless contraindicated by renal status)    ASSESSMENT AND PLAN:     1. Skin ulcer of toe of left foot with fat layer exposed (HCC)  Comments: Patient originally sent to clinic for full-thickness ulcer to distal left second toe.  New wound discovered in clinic  on 10/16, medial left fourth toe and interdigital space.    10/22/19  -No debridement of these wounds due to poor lower extremity perfusion  -X-ray of toes ordered, but not yet completed  -LPS rounds once x-ray completed  -scheduled to establish with Dr. Mcdonnell    Wound Care: paint with betadine daily. Foam dressing  -patients family requesting order for home health stating that they are no available to assist with wound care as they spend much of their [place] in the [place].  Per the family there is an LPN at [place] who should be able to assist with this daily and could work in collaboration with home health.     2. PAD, Decreased pedal pulses    10/22/19: Unable to palpate pulses  US results reviewed with patient and family. Discussed wound care limitations, risks including infection/amputation. The family members seem resistant to this referral, but after discussion agreed to meet with Dr. Mcdonnell and listen to her recommendations.   Scheduled with Dr Mcdonnell      3. Pain of toe of left foot    -2% viscous lidocaine applied topically to wound bed for approximately 5 minutes prior to wound care  -Patient tolerated procedure today with no complaints of discomfort.    4. Toe infection    10/16: Wound culture from 10/7+ for Klebsiella oxytoca  -Patient completed prescribed Bactrim DS   -Last GFR 42- recheck renal function    30 min spent face to face with patient, >50% of time spent counseling, coordinating care, reviewing records, discussing POC, educating patient/family regarding results, PAD, wound care, infection risk

## 2019-10-28 NOTE — LETTER
October 28, 2019      Jt Arevalo has been discharged from wound clinic today. Please continue to follow up for wound care at home as he is unable to perform dressing changes himself. Continue to paint wounds with betadine and cover with foam dressing or dry gauze and secure with tape. Please call wound clinic with any questions or concerns.     Thank you,           Dr. Li Mcdonnell

## 2019-10-28 NOTE — PROGRESS NOTES
Received referral from [facility]. Medications reviewed. No clinically significant interactions noted.     Try to avoid using excessive amounts of nonsteroidal anti-inflammatory such as ibuprofen with aspirin.  It inhibits the antiplatelet effect of aspirin.  Also increases risks of GI bleed and negatively affects kidney function.    Ronak Martel M.S., Pharm.D., [facility], [facility]    This note was created using voice recognition software (Dragon). The accuracy of the dictation is limited by the abilities of the software. I have reviewed the note prior to signing, however some errors in grammar and context are still possible. If you have any questions related to this note please do not hesitate to contact our office.

## 2019-10-28 NOTE — PROGRESS NOTES
"Provider Encounter- Full Thickness wound    HISTORY OF PRESENT ILLNESS                              START OF CARE IN CLINIC: 10/07/2019               REFERRING PROVIDER: Jose Garcia MD              WOUND- Full thickness wound              LOCATION: Left distal 2nd toe - hammertoe            Left medial 4th toe and 4-5 interdigital space-first noted in clinic on 10/16/2019              WOUND HISTORY: Patient is an 85 year old male who resides at [facility]. He is hard of hearing despite hearing aids which makes it difficult to obtain a clear history of wound, also a poor historian. He does state that  \"someone from the facility\" cut off his toenail on his left 2nd toe, and cut \"too deep.\"  Wound culture taken in clinic on initial visit, positive for heavy growth of Klebsiella oxytoca. He was prescribed a 10-day course of Bactrim DS on 10/9/2019.              PERTINENT PMH: HTN, gout, CKD III               IMAGING:None              VASCULAR STUDIES:arterial studies 10/7/19.     Arterial study bilateral LE 10/18/19:  1. Severe bilateral peripheral artery disease bilaterally.  2. On the RIGHT, there is occlusion of mid femoral artery, posterior tibial artery, peroneal artery and distal anterior tibial artery.  3. On the LEFT, there is high-grade stenosis versus occlusion of the distal common femoral artery/proximal superficial femoral artery. Additionally, there is occlusion of posterior tibial artery and proximal peroneal artery.  4. ABIs were not obtained, as ankle pressures were not measurable.                                 LAST  WOUND CULTURE:  DATE: 10/7/19 +Klebsiella oxytoca- treated with Bactrim by MARCELINO Thornton   DIABETES: No   TOBACCO USE: Denies    Results:   Results for HECTOR FUENTES (MRN [id number]) as of 10/22/2019 09:36   Ref. Range 10/18/2019 12:30   Sodium Latest Ref Range: 135 - 145 mmol/L 136   Potassium Latest Ref Range: 3.6 - 5.5 mmol/L 4.8   Chloride Latest Ref Range: " 96 - 112 mmol/L 109   Co2 Latest Ref Range: 20 - 33 mmol/L 17 (L)   Anion Gap Latest Ref Range: 0.0 - 11.9  10.0   Glucose Latest Ref Range: 65 - 99 mg/dL 59 (L)   Bun Latest Ref Range: 8 - 22 mg/dL 30 (H)   Creatinine Latest Ref Range: 0.50 - 1.40 mg/dL 1.57 (H)   GFR If  Latest Ref Range: >60 mL/min/1.73 m 2 51 (A)   GFR If Non  Latest Ref Range: >60 mL/min/1.73 m 2 42 (A)   Calcium Latest Ref Range: 8.5 - 10.5 mg/dL 9.4       10/16/2019 : Initial provider visit with LANEY Bean.  Patient is feeling well, denies fever, chills, nausea, vomiting.  Patient presents today company by caregiver from [place].  He is a very poor historian, so obtaining history of these wounds very difficult.  A new wound was discovered to his medial left fourth toe and interdigital space.  Unable to palpate pulses.  Arterial studies ordered, x-ray ordered.  No debridement due to uncertainty of lower extremity perfusion.  Rx for offloading shoe.    10/22/19: Clinic visit with LANEY Rehman, FNP-BC. Patient reports feeling well. He is a poor historian.  He is accompanied today by family members. He resides at [facility]. He does report intermittent pain in his calves and feet. Denies wound drainage, odor. Denies erythema, swelling.  Completed Bactrim as prescribed. Denies n/v/d, difficulty urinating, swelling.     10/28/2019:  Clinic visit with Dr. Mcdonnell.  Patient is somewhat repetitive in his speech.  He has pain in his left toes that sounds like rest pain.     PAST MEDICAL HISTORY:   Past Medical History:   Diagnosis Date   • Anxiety    • Arthritis     bilateral hips  and other verious joints   • Cancer (HCC)     prostate   • Cataract    • Depression    • Glaucoma     bilateral    • Hypertension    • IBD (inflammatory bowel disease)     IBS   • Muscle disorder     pt on Neurontin   • Stroke (HCC)     speech impairment       PAST SURGICAL HISTORY:   Past Surgical History:    Procedure Laterality Date   • EYE SURGERY      bilateral cataracts   • PROSTATECTOMY, RADICAL RETRO      seed implants   • STENT PLACEMENT          MEDICATIONS:   Current Outpatient Medications   Medication   • ibuprofen (MOTRIN) 200 MG Tab   • amLODIPine (NORVASC) 5 MG Tab   • latanoprost (XALATAN) 0.005 % Solution   • SIMBRINZA 1-0.2 % Suspension   • aspirin EC (ECOTRIN) 81 MG Tablet Delayed Response     No current facility-administered medications for this visit.        ALLERGIES:  No Known Allergies      SOCIAL HISTORY:   Social History     Socioeconomic History   • Marital status:      Spouse name: Not on file   • Number of children: Not on file   • Years of education: Not on file   • Highest education level: Not on file   Occupational History   • Not on file   Social Needs   • Financial resource strain: Not on file   • Food insecurity:     Worry: Not on file     Inability: Not on file   • Transportation needs:     Medical: Not on file     Non-medical: Not on file   Tobacco Use   • Smoking status: Former Smoker     Last attempt to quit: 1991     Years since quittin.5   • Smokeless tobacco: Never Used   Substance and Sexual Activity   • Alcohol use: Yes     Alcohol/week: 0.6 oz     Types: 1 Shots of liquor per week     Comment: rare   • Drug use: No   • Sexual activity: Never     Birth control/protection: Condom   Lifestyle   • Physical activity:     Days per week: Not on file     Minutes per session: Not on file   • Stress: Not on file   Relationships   • Social connections:     Talks on phone: Not on file     Gets together: Not on file     Attends Confucianist service: Not on file     Active member of club or organization: Not on file     Attends meetings of clubs or organizations: Not on file     Relationship status: Not on file   • Intimate partner violence:     Fear of current or ex partner: Not on file     Emotionally abused: Not on file     Physically abused: Not on file     Forced sexual  activity: Not on file   Other Topics Concern   • Not on file   Social History Narrative   • Not on file       FAMILY HISTORY:   Family History   Problem Relation Age of Onset   • No Known Problems Mother    • No Known Problems Father    • No Known Problems Sister    • No Known Problems Brother    • No Known Problems Brother         REVIEW OF SYSTEMS:   Review of Systems   Constitutional: Negative for chills and fever.   HENT: Positive for hearing loss.    Respiratory: Negative for cough and shortness of breath.    Cardiovascular: Negative for chest pain, claudication and leg swelling.   Gastrointestinal: Negative for abdominal pain, constipation, diarrhea, nausea and vomiting.   Musculoskeletal: Negative for joint pain.   Neurological:        Numbness in both feet       PHYSICAL EXAMINATION:   There were no vitals taken for this visit.  Physical Exam   Constitutional: He is well-developed, well-nourished, and in no distress.   Elderly, frail male   HENT:   Head: Normocephalic.   Cardiovascular:   Unable to palpate pedal pulses, or locate with Doppler   Pulmonary/Chest: Effort normal.   Musculoskeletal: He exhibits no edema.   Neurological: He is alert.   Poor historian   Skin: Skin is warm.   Full-thickness wounds to left distal second toe, first medial metatarsal head and left medial fourth toe interdigital space  Skin cool, decreased hair growth  Refer to wound flowsheet   Psychiatric: Affect and judgment normal.       Wound Assessment       Wound 10/07/19 Toe (Comment which one) Left distal 2nd toe (Active)   Wound Image   10/28/2019  1:30 PM   Site Assessment Yellow;Painful;Brown 10/28/2019  1:30 PM   Marion-wound Assessment Intact;Blanchable erythema;Fragile 10/28/2019  1:30 PM   Margins CHARANJIT 10/28/2019  1:30 PM   Wound Length (cm) 1 cm 10/28/2019  1:30 PM   Wound Width (cm) 1 cm 10/28/2019  1:30 PM   Wound Surface Area (cm^2) 1 cm^2 10/28/2019  1:30 PM   Drainage Amount Small 10/28/2019  1:30 PM   Drainage  Description Serosanguineous 10/28/2019  1:30 PM   Non-staged Wound Description Full thickness 10/28/2019  1:30 PM   Periwound Protectant Skin Protectant wipes to Periwound 10/28/2019  1:30 PM   Dressing Options Other (Comments) 10/28/2019  1:30 PM   Dressing Changed Changed 10/28/2019  1:30 PM   Dressing Status Clean;Dry;Intact 10/28/2019  1:30 PM   WOUND NURSE ONLY - Odor None 10/28/2019  1:30 PM   WOUND NURSE ONLY - Pulses Not palpable 10/28/2019  1:30 PM   WOUND NURSE ONLY - Tissue Type and Percentage 100% brown/yellow 10/28/2019  1:30 PM       Wound 10/16/19 Full Thickness Wound Toe (Comment which one) --Left lateral 4th Toe (Active)   Wound Image   10/28/2019  1:30 PM   Site Assessment Black;Red;Yellow 10/28/2019  1:30 PM   Marion-wound Assessment Intact;Dark edges;Fragile 10/28/2019  1:30 PM   Margins CHARANJIT 10/28/2019  1:30 PM   Wound Length (cm) 1.5 cm 10/28/2019  1:30 PM   Wound Width (cm) 0.5 cm 10/28/2019  1:30 PM   Wound Surface Area (cm^2) 0.75 cm^2 10/28/2019  1:30 PM   Drainage Amount Scant 10/28/2019  1:30 PM   Drainage Description Serosanguineous 10/28/2019  1:30 PM   Non-staged Wound Description Full thickness 10/28/2019  1:30 PM   Periwound Protectant Skin Protectant wipes to Periwound 10/28/2019  1:30 PM   Dressing Options Other (Comments) 10/28/2019  1:30 PM   Dressing Changed Changed 10/28/2019  1:30 PM   Dressing Status Clean;Dry;Intact 10/28/2019  1:30 PM   WOUND NURSE ONLY - Odor None 10/28/2019  1:30 PM   WOUND NURSE ONLY - Pulses Not palpable 10/28/2019  1:30 PM   WOUND NURSE ONLY - Tissue Type and Percentage 5% red; 95% yellow/black 10/28/2019  1:30 PM       Wound 10/22/19 Arterial Ulcer Foot L medial foot 1st MTH (Active)   Wound Image   10/28/2019  1:30 PM   Site Assessment Dry;Brown 10/28/2019  1:30 PM   Marion-wound Assessment Intact;Blanchable erythema 10/28/2019  1:30 PM   Wound Length (cm) 0.8 cm 10/28/2019  1:30 PM   Wound Width (cm) 0.8 cm 10/28/2019  1:30 PM   Wound Surface Area (cm^2)  0.64 cm^2 10/28/2019  1:30 PM   Drainage Amount None 10/28/2019  1:30 PM   Non-staged Wound Description Full thickness 10/28/2019  1:30 PM   Periwound Protectant Skin Protectant wipes to Periwound 10/28/2019  1:30 PM   Dressing Changed Changed 10/28/2019  1:30 PM   Dressing Status Clean;Dry;Intact 10/28/2019  1:30 PM   Dressing Change Frequency Other (Comments) 10/28/2019  1:30 PM   WOUND NURSE ONLY - Odor None 10/28/2019  1:30 PM   WOUND NURSE ONLY - Pulses Not palpable 10/28/2019  1:30 PM   WOUND NURSE ONLY - Tissue Type and Percentage 100% dry eschar 10/28/2019  1:30 PM                          PROCEDURE: NO DEBRIDEMENT due to PAD     -Wound care completed by  Karolina Lewis RN  Betadine paint to left second toe and medial metatarsal head.       PATIENT EDUCATION  -Advised to go to ER for any increased redness, swelling, drainage or odor, or if patient develops fever, chills, nausea or vomiting.  -Importance of adequate nutrition for wound healing  -Increase protein intake (unless contraindicated by renal status)    ASSESSMENT AND PLAN:     1. Skin ulcer of toe of left foot with fat layer exposed (HCC)  Comments: Patient originally sent to clinic for full-thickness ulcer to distal left second toe.  New wound discovered in clinic on 10/16, medial left fourth toe and interdigital space.    10/28/19  -No debridement of these wounds due to poor lower extremity perfusion  -X-ray of toes ordered, but not yet completed  -LPS rounds after arterial intervention.  Not until, as I suspect tibial disease may limit healing potential for any surgery on the digits    Wound Care: paint with betadine daily. Foam dressing  -patients family requesting order for home health stating that they are no available to assist with wound care as they [place] much [place].  Per the family there is an LPN at [facility] who should be able to assist with this daily and could work in collaboration with home health. Patient is  the uncle of his POA    2. PAD, Decreased pedal pulses    10/28/19: Unable to palpate pulses.  Arterial studies demonstrate Left SFA stenosis/occlusion and 2 of 3 tibial arteries occluded.  Arteriography and possible intervention indicated. Recent labs indicate renal insufficiency, but this is new and may not be demonstrated on new labs. If improved perfusion cannot be accomplished, the difficult decision will arise of how far to go with his care. He will need hydration protocol prior to arteriography.     3. Pain of toe of left foot    -2% viscous lidocaine applied topically to wound bed for approximately 5 minutes prior to wound care.  I suspect this is ischemic pain.  -Patient tolerated procedure today with no complaints of discomfort.    4. Toe infection    10/28: Wound culture from 10/7+ for Klebsiella oxytoca  -Patient completed prescribed Bactrim DS.  Infection appears resolved.      30 min spent face to face with patient, >50% of time spent counseling, coordinating care, reviewing records, discussing POC, educating patient/family regarding results, PAD, wound care, infection risk

## 2019-11-20 NOTE — Clinical Note
FYI  Falls Template   Date & Time of fall: 11/19/19 about 1100   Cause of fall: weak left leg gave out on him when ambulating   Location: small apartment in senior living facility   Was the fall witnessed? No   Actions taken by patient: pressed his wrist call watch for help to get him up   Any new injury? None known.   Any recent medication changes? Nothing new.   Reviewed Post Fall Questionnaire: Yes.   Post fall instructions: always use walker when ambulating   Actions Taken:notified nursing manager and physician manager. Discussed again with pt need to use walker when ambulating. He expressed interest in getting a wheelchair.

## 2019-11-20 NOTE — Clinical Note
FYI  Falls Template         Date & Time of fall: 11/19/19 about 1100           Cause of fall:  weak left leg gave out on him when ambulating           Location:  small apartment in custodial facility           Was the fall witnessed?  No                         Actions taken by patient:  pressed his wrist call watch for help to get him up            Any new injury?  None known.                          Any recent medication changes?  Nothing new.            Reviewed Post Fall Questionnaire: Yes.                  Post fall instructions: always use walker when ambulating             Actions Taken:notified nursing manager and physician manager. Discussed again with pt need to use walker when ambulating. He expressed interest in getting a wheelchair.

## 2019-11-20 NOTE — Clinical Note
ACTION REQUIRED  Wound care order for dorsal foot wound submitted for review and signature.  Pt states he fell yesterday. Is not using his walker in apartment and states his left leg gives out on him walking from kitchen or bathroom to living room. Coming from bathroom, states his left leg gave way and he fell forward and landed on his left side. No apparent injury. This time, had to call for male staffer to help him up. Walkway in apartment coming from kitchen or bathroom to dining/living room is narrow but advised him he has to use his walker. Is talking of having a w/c. LLE edematous. Severe PAD with non-healing wounds. Eschar left bunion and tip of second toe. Deep open wound now lateral 4th toe in 4th and 5th toe space. Wounds dressed as ordered. Left dorsal foot wound very concerning. Biatain foam dressing removed was saturated with serous exudate. Pt had used scotch tape to tape dressings together when they started to come loose. Dorsal foot ulcer 4.2 x 4.7 cm. Photo taken. Area covered with Hydrofera Blue Ready foam and secured with one strip of Hypafix tape. Secured all the dressings with light wrap of kerlix around foot but leaving tip of great toe exposed for checks. Will give him more padding under post-op shoe strap. Using post-op shoe, but admits to taking it off in the apartment some of the time. Advised him he needs to have it on if walking. Does have a recliner but leg feels better down with arterial disease.  Thank you, Grace Castellanos, RN, MSN, CWON

## 2020-01-01 ENCOUNTER — HOME CARE VISIT (OUTPATIENT)
Dept: HOME HEALTH SERVICES | Facility: HOME HEALTHCARE | Age: 85
End: 2020-01-01
Payer: MEDICARE

## 2020-01-01 ENCOUNTER — HOME CARE VISIT (OUTPATIENT)
Dept: HOSPICE | Facility: HOSPICE | Age: 85
End: 2020-01-01
Payer: MEDICARE

## 2020-01-01 ENCOUNTER — TELEPHONE (OUTPATIENT)
Dept: MEDICAL GROUP | Facility: PHYSICIAN GROUP | Age: 85
End: 2020-01-01

## 2020-01-01 ENCOUNTER — HOSPICE ADMISSION (OUTPATIENT)
Dept: HOSPICE | Facility: HOSPICE | Age: 85
End: 2020-01-01
Payer: MEDICARE

## 2020-01-01 ENCOUNTER — OFFICE VISIT (OUTPATIENT)
Dept: MEDICAL GROUP | Facility: PHYSICIAN GROUP | Age: 85
End: 2020-01-01
Payer: MEDICARE

## 2020-01-01 VITALS
RESPIRATION RATE: 16 BRPM | OXYGEN SATURATION: 97 % | HEART RATE: 85 BPM | DIASTOLIC BLOOD PRESSURE: 64 MMHG | TEMPERATURE: 97.5 F | SYSTOLIC BLOOD PRESSURE: 110 MMHG

## 2020-01-01 VITALS
HEART RATE: 96 BPM | OXYGEN SATURATION: 96 % | TEMPERATURE: 98.2 F | DIASTOLIC BLOOD PRESSURE: 64 MMHG | RESPIRATION RATE: 16 BRPM | SYSTOLIC BLOOD PRESSURE: 110 MMHG

## 2020-01-01 VITALS
RESPIRATION RATE: 18 BRPM | DIASTOLIC BLOOD PRESSURE: 50 MMHG | OXYGEN SATURATION: 85 % | HEART RATE: 86 BPM | TEMPERATURE: 97.6 F | SYSTOLIC BLOOD PRESSURE: 88 MMHG

## 2020-01-01 VITALS
HEART RATE: 119 BPM | TEMPERATURE: 98.8 F | RESPIRATION RATE: 22 BRPM | SYSTOLIC BLOOD PRESSURE: 128 MMHG | OXYGEN SATURATION: 98 % | DIASTOLIC BLOOD PRESSURE: 80 MMHG

## 2020-01-01 VITALS
TEMPERATURE: 97.8 F | OXYGEN SATURATION: 97 % | DIASTOLIC BLOOD PRESSURE: 58 MMHG | RESPIRATION RATE: 16 BRPM | SYSTOLIC BLOOD PRESSURE: 118 MMHG | HEART RATE: 93 BPM

## 2020-01-01 VITALS
DIASTOLIC BLOOD PRESSURE: 65 MMHG | RESPIRATION RATE: 18 BRPM | TEMPERATURE: 97.3 F | SYSTOLIC BLOOD PRESSURE: 118 MMHG | HEART RATE: 98 BPM | OXYGEN SATURATION: 97 %

## 2020-01-01 VITALS
HEART RATE: 100 BPM | SYSTOLIC BLOOD PRESSURE: 102 MMHG | TEMPERATURE: 97.6 F | OXYGEN SATURATION: 99 % | DIASTOLIC BLOOD PRESSURE: 80 MMHG | RESPIRATION RATE: 24 BRPM

## 2020-01-01 VITALS
TEMPERATURE: 97.8 F | SYSTOLIC BLOOD PRESSURE: 100 MMHG | OXYGEN SATURATION: 94 % | HEART RATE: 80 BPM | RESPIRATION RATE: 18 BRPM | DIASTOLIC BLOOD PRESSURE: 50 MMHG

## 2020-01-01 VITALS
HEART RATE: 90 BPM | RESPIRATION RATE: 17 BRPM | OXYGEN SATURATION: 93 % | TEMPERATURE: 97.8 F | SYSTOLIC BLOOD PRESSURE: 110 MMHG | DIASTOLIC BLOOD PRESSURE: 68 MMHG

## 2020-01-01 VITALS
RESPIRATION RATE: 16 BRPM | DIASTOLIC BLOOD PRESSURE: 72 MMHG | HEART RATE: 54 BPM | OXYGEN SATURATION: 90 % | SYSTOLIC BLOOD PRESSURE: 112 MMHG | TEMPERATURE: 97.5 F

## 2020-01-01 VITALS
TEMPERATURE: 97.6 F | RESPIRATION RATE: 24 BRPM | SYSTOLIC BLOOD PRESSURE: 102 MMHG | HEART RATE: 96 BPM | DIASTOLIC BLOOD PRESSURE: 50 MMHG | OXYGEN SATURATION: 90 %

## 2020-01-01 VITALS
SYSTOLIC BLOOD PRESSURE: 102 MMHG | TEMPERATURE: 96 F | OXYGEN SATURATION: 96 % | RESPIRATION RATE: 22 BRPM | DIASTOLIC BLOOD PRESSURE: 70 MMHG | HEART RATE: 76 BPM

## 2020-01-01 VITALS
OXYGEN SATURATION: 93 % | HEART RATE: 97 BPM | TEMPERATURE: 97.4 F | RESPIRATION RATE: 16 BRPM | SYSTOLIC BLOOD PRESSURE: 98 MMHG | DIASTOLIC BLOOD PRESSURE: 62 MMHG

## 2020-01-01 VITALS
OXYGEN SATURATION: 98 % | RESPIRATION RATE: 16 BRPM | DIASTOLIC BLOOD PRESSURE: 62 MMHG | TEMPERATURE: 97.5 F | HEART RATE: 88 BPM | SYSTOLIC BLOOD PRESSURE: 118 MMHG

## 2020-01-01 VITALS — HEART RATE: 78 BPM | DIASTOLIC BLOOD PRESSURE: 72 MMHG | RESPIRATION RATE: 16 BRPM | SYSTOLIC BLOOD PRESSURE: 122 MMHG

## 2020-01-01 VITALS
HEART RATE: 88 BPM | DIASTOLIC BLOOD PRESSURE: 62 MMHG | SYSTOLIC BLOOD PRESSURE: 118 MMHG | OXYGEN SATURATION: 98 % | RESPIRATION RATE: 16 BRPM | TEMPERATURE: 97.5 F

## 2020-01-01 VITALS
OXYGEN SATURATION: 95 % | SYSTOLIC BLOOD PRESSURE: 108 MMHG | TEMPERATURE: 97.3 F | HEART RATE: 80 BPM | DIASTOLIC BLOOD PRESSURE: 60 MMHG | RESPIRATION RATE: 16 BRPM

## 2020-01-01 VITALS
HEART RATE: 96 BPM | TEMPERATURE: 97.6 F | OXYGEN SATURATION: 97 % | RESPIRATION RATE: 16 BRPM | DIASTOLIC BLOOD PRESSURE: 68 MMHG | SYSTOLIC BLOOD PRESSURE: 118 MMHG

## 2020-01-01 VITALS
OXYGEN SATURATION: 99 % | RESPIRATION RATE: 16 BRPM | HEART RATE: 72 BPM | SYSTOLIC BLOOD PRESSURE: 100 MMHG | DIASTOLIC BLOOD PRESSURE: 56 MMHG | TEMPERATURE: 98.2 F

## 2020-01-01 VITALS
SYSTOLIC BLOOD PRESSURE: 118 MMHG | RESPIRATION RATE: 16 BRPM | TEMPERATURE: 97.8 F | OXYGEN SATURATION: 92 % | HEART RATE: 88 BPM | DIASTOLIC BLOOD PRESSURE: 70 MMHG

## 2020-01-01 VITALS
RESPIRATION RATE: 16 BRPM | SYSTOLIC BLOOD PRESSURE: 132 MMHG | DIASTOLIC BLOOD PRESSURE: 60 MMHG | HEART RATE: 76 BPM | TEMPERATURE: 98.2 F

## 2020-01-01 VITALS
SYSTOLIC BLOOD PRESSURE: 110 MMHG | DIASTOLIC BLOOD PRESSURE: 58 MMHG | OXYGEN SATURATION: 97 % | RESPIRATION RATE: 16 BRPM | HEART RATE: 88 BPM | TEMPERATURE: 98.8 F

## 2020-01-01 VITALS
HEART RATE: 97 BPM | RESPIRATION RATE: 16 BRPM | OXYGEN SATURATION: 97 % | SYSTOLIC BLOOD PRESSURE: 96 MMHG | DIASTOLIC BLOOD PRESSURE: 70 MMHG | TEMPERATURE: 98.5 F

## 2020-01-01 VITALS
HEART RATE: 102 BPM | SYSTOLIC BLOOD PRESSURE: 98 MMHG | TEMPERATURE: 97.9 F | DIASTOLIC BLOOD PRESSURE: 62 MMHG | RESPIRATION RATE: 16 BRPM | OXYGEN SATURATION: 98 %

## 2020-01-01 VITALS
TEMPERATURE: 97.4 F | HEART RATE: 104 BPM | SYSTOLIC BLOOD PRESSURE: 92 MMHG | DIASTOLIC BLOOD PRESSURE: 58 MMHG | RESPIRATION RATE: 20 BRPM | OXYGEN SATURATION: 93 %

## 2020-01-01 DIAGNOSIS — R29.6 RECURRENT FALLS: ICD-10-CM

## 2020-01-01 DIAGNOSIS — R53.1 WEAKNESS GENERALIZED: ICD-10-CM

## 2020-01-01 DIAGNOSIS — M79.675 PAIN OF TOE OF LEFT FOOT: ICD-10-CM

## 2020-01-01 DIAGNOSIS — R47.81 SLURRED SPEECH: ICD-10-CM

## 2020-01-01 DIAGNOSIS — F02.80 LATE ONSET ALZHEIMER'S DISEASE WITHOUT BEHAVIORAL DISTURBANCE (HCC): ICD-10-CM

## 2020-01-01 DIAGNOSIS — L97.529 ULCER OF TOE OF LEFT FOOT, UNSPECIFIED ULCER STAGE (HCC): ICD-10-CM

## 2020-01-01 DIAGNOSIS — H40.89 OTHER GLAUCOMA OF RIGHT EYE: ICD-10-CM

## 2020-01-01 DIAGNOSIS — R63.4 ABNORMAL WEIGHT LOSS: ICD-10-CM

## 2020-01-01 DIAGNOSIS — G30.1 LATE ONSET ALZHEIMER'S DISEASE WITHOUT BEHAVIORAL DISTURBANCE (HCC): ICD-10-CM

## 2020-01-01 DIAGNOSIS — I73.9 PERIPHERAL VASCULAR DISEASE (HCC): ICD-10-CM

## 2020-01-01 DIAGNOSIS — Z51.5 PALLIATIVE CARE STATUS: ICD-10-CM

## 2020-01-01 PROCEDURE — 665999 HH PPS REVENUE DEBIT

## 2020-01-01 PROCEDURE — S9126 HOSPICE CARE, IN THE HOME, P: HCPCS

## 2020-01-01 PROCEDURE — 665998 HH PPS REVENUE CREDIT

## 2020-01-01 PROCEDURE — 99214 OFFICE O/P EST MOD 30 MIN: CPT | Performed by: FAMILY MEDICINE

## 2020-01-01 PROCEDURE — G0299 HHS/HOSPICE OF RN EA 15 MIN: HCPCS

## 2020-01-01 PROCEDURE — A6402 STERILE GAUZE <= 16 SQ IN: HCPCS

## 2020-01-01 PROCEDURE — A6223 GAUZE >16<=48 NO W/SAL W/O B: HCPCS

## 2020-01-01 PROCEDURE — A6441 PAD BAND W>=3" <5"/YD: HCPCS

## 2020-01-01 PROCEDURE — A6403 STERILE GAUZE>16 <= 48 SQ IN: HCPCS

## 2020-01-01 PROCEDURE — A6196 ALGINATE DRESSING <=16 SQ IN: HCPCS

## 2020-01-01 PROCEDURE — 665997 HH PPS REVENUE ADJ

## 2020-01-01 PROCEDURE — A4927 NON-STERILE GLOVES: HCPCS

## 2020-01-01 PROCEDURE — G0156 HHCP-SVS OF AIDE,EA 15 MIN: HCPCS

## 2020-01-01 PROCEDURE — A6197 ALGINATE DRSG >16 <=48 SQ IN: HCPCS

## 2020-01-01 PROCEDURE — A6234 HYDROCOLLD DRG <=16 W/O BDR: HCPCS

## 2020-01-01 PROCEDURE — A6209 FOAM DRSG <=16 SQ IN W/O BDR: HCPCS

## 2020-01-01 PROCEDURE — G0493 RN CARE EA 15 MIN HH/HOSPICE: HCPCS

## 2020-01-01 PROCEDURE — A6253 ABSORPT DRG > 48 SQ IN W/O B: HCPCS

## 2020-01-01 PROCEDURE — A6250 SKIN SEAL PROTECT MOISTURIZR: HCPCS

## 2020-01-01 PROCEDURE — A4452 WATERPROOF TAPE: HCPCS

## 2020-01-01 PROCEDURE — 665036 HSPC NOTICE OF ELECTION NOE

## 2020-01-01 PROCEDURE — G0155 HHCP-SVS OF CSW,EA 15 MIN: HCPCS

## 2020-01-01 PROCEDURE — A6210 FOAM DRG >16<=48 SQ IN W/O B: HCPCS

## 2020-01-01 PROCEDURE — G0179 MD RECERTIFICATION HHA PT: HCPCS | Performed by: FAMILY MEDICINE

## 2020-01-01 RX ORDER — TRAMADOL HYDROCHLORIDE 50 MG/1
TABLET ORAL
Qty: 80 TAB | Refills: 0 | Status: SHIPPED | OUTPATIENT
Start: 2020-01-01 | End: 2020-01-01

## 2020-01-01 RX ORDER — TRAMADOL HYDROCHLORIDE 50 MG/1
TABLET ORAL
Qty: 80 TAB | Refills: 0 | Status: SHIPPED
Start: 2020-01-01 | End: 2020-01-01

## 2020-01-01 SDOH — ECONOMIC STABILITY: GENERAL

## 2020-01-01 ASSESSMENT — ENCOUNTER SYMPTOMS
DRY SKIN: 1
NAUSEA: DENIES
LAST BOWEL MOVEMENT: 65421
VOMITING: DENIES
BOWEL INCONTINENCE: 1
FORGETFULNESS: 1
SKIN LESIONS: 1
MUSCLE WEAKNESS: 1
VOMITING: DENIES
BOWEL INCONTINENCE: 1
MUSCLE WEAKNESS: 1
DRY SKIN: 1
SLEEP QUALITY: FAIR
DESCRIPTION OF MEMORY LOSS: LONG TERM
NAUSEA: NO
FATIGUES EASILY: 1
SOMNOLENCE: 1
VOMITING: DENIES
MUSCLE WEAKNESS: 1
VOMITING: NO
MUSCLE WEAKNESS: 1
BOWEL INCONTINENCE: 1
LAST BOWEL MOVEMENT: 65410
DIFFICULTY THINKING: 1
VOMITING: DENIES
BOWEL INCONTINENCE: 1
LAST BOWEL MOVEMENT: 65416
FATIGUES EASILY: 1
LAST BOWEL MOVEMENT: 65423
VOMITING: DENIES
FATIGUES EASILY: 1
FATIGUE: 1
SLEEP QUALITY: FAIR
SHORTNESS OF BREATH: T
DIFFICULTY THINKING: 1
DEBILITATING PAIN: 1
VOMITING: DENIES
STOOL FREQUENCY: LESS THAN DAILY
MUSCLE WEAKNESS: 1
VOMITING: DENIES
SHORTNESS OF BREATH: T
DESCRIPTION OF MEMORY LOSS: SHORT TERM
NAUSEA: DENIES
MUSCLE WEAKNESS: 1
FATIGUE: 1
DEBILITATING PAIN: 1
DRY SKIN: 1
NAUSEA: DENIES
NAUSEA: DENIES
SLEEP QUALITY: FAIR
DEBILITATING PAIN: 1
NAUSEA: DENIES
SLEEP QUALITY: FAIR
DESCRIPTION OF MEMORY LOSS: LONG TERM
TREMORS: 1
VOMITING: NO
FATIGUES EASILY: 1
LAST BOWEL MOVEMENT: 65410
FORGETFULNESS: 1
DRY SKIN: 1
NAUSEA: DENIES
NAUSEA: DENIES
STOOL FREQUENCY: LESS THAN DAILY
DIFFICULTY THINKING: 1
POOR JUDGMENT: 1
STOOL FREQUENCY: LESS THAN DAILY
DIFFICULTY THINKING: 1
SLEEP QUALITY: ADEQUATE
STOOL FREQUENCY: LESS THAN DAILY
NAUSEA: NO
NAUSEA: DENIES
STOOL FREQUENCY: LESS THAN DAILY
FATIGUE: 1
SHORTNESS OF BREATH: T
DIFFICULTY THINKING: 1
DIFFICULTY THINKING: 1
FATIGUE: 1
DIFFICULTY THINKING: 1
LIMITED RANGE OF MOTION: 1
SLEEP QUALITY: ADEQUATE
DESCRIPTION OF MEMORY LOSS: SHORT TERM
SLEEP QUALITY: FAIR
NAUSEA: DENIES
DESCRIPTION OF MEMORY LOSS: SHORT TERM
MUSCLE WEAKNESS: 1
SLEEP QUALITY: ADEQUATE
DRY SKIN: 1
VOMITING: DENIES
DESCRIPTION OF MEMORY LOSS: LONG TERM
BOWEL INCONTINENCE: 1
MUSCLE WEAKNESS: 1
DRY SKIN: 1
SLEEP QUALITY: FAIR
DEBILITATING PAIN: 1
SOMNOLENCE: 1
BOWEL INCONTINENCE: 1
SLEEP QUALITY: FAIR
FATIGUES EASILY: 1
VOMITING: DENIES
MUSCLE WEAKNESS: 1
LAST BOWEL MOVEMENT: 65423
DEBILITATING PAIN: 1
MUSCLE WEAKNESS: 1
FATIGUES EASILY: 1
STOOL FREQUENCY: LESS THAN DAILY
DEBILITATING PAIN: 1
DIFFICULTY THINKING: 1

## 2020-01-01 ASSESSMENT — SOCIAL DETERMINANTS OF HEALTH (SDOH)
ACTIVE STRESSOR - HEALTH CHANGES: 1
ACTIVE STRESSOR - HEALTH CHANGES: 1
ACTIVE STRESSOR - NO STRESS FACTORS: 1
ACTIVE STRESSOR - NO STRESS FACTORS: 1
ACTIVE STRESSOR - HEALTH CHANGES: 1
ACTIVE STRESSOR - EXHAUSTION: 1
ACTIVE STRESSOR - EXHAUSTION: 1
ACTIVE STRESSOR - NO STRESS FACTORS: 1
ACTIVE STRESSOR - NO STRESS FACTORS: 1
ACTIVE STRESSOR - EXHAUSTION: 1
ACTIVE STRESSOR - NO STRESS FACTORS: 1
ACTIVE STRESSOR - NO STRESS FACTORS: 1

## 2020-01-01 ASSESSMENT — ACTIVITIES OF DAILY LIVING (ADL)
CONTINENCE_REQUIRES_ASSISTANCE: 1
DRESSING_REQUIRES_ASSISTANCE: 1
BATHING_REQUIRES_ASSISTANCE: 1
AMBULATION_REQUIRES_ASSISTANCE: 1
MONEY MANAGEMENT (EXPENSES/BILLS): TOTALLY DEPENDENT
BATHING_REQUIRES_ASSISTANCE: 1
PHYSICAL_TRANSFER_REQUIRES_ASSISTANCE: 1
DRESSING_REQUIRES_ASSISTANCE: 1
CONTINENCE_REQUIRES_ASSISTANCE: 1
AMBULATION ASSISTANCE: STAND BY ASSIST
HOME_HEALTH_OASIS: 01
CURRENT_FUNCTION: STAND BY ASSIST
OASIS_M1830: 03
OASIS_M1830: 03
PHYSICAL_TRANSFER_REQUIRES_ASSISTANCE: 1

## 2020-01-01 ASSESSMENT — PATIENT HEALTH QUESTIONNAIRE - PHQ9
CLINICAL INTERPRETATION OF PHQ2 SCORE: 0

## 2020-01-20 NOTE — PROGRESS NOTES
Subjective:   Jt Arevalo is a 86 y.o. male here today for ulcers and discussion of hospice. He is accompanied by his nephew, Denys, and niece-in-law, Nohemi. They are his primary caregivers.    1. Ulcer of toe of left foot, unspecified ulcer stage (HCC)  2. Pain of toe of left foot  3. Peripheral vascular disease (HCC)  He has history of ulcers on the toes of his left foot and peripheral vascular disease. He is treated by Wound Care for this. He reports it is moderately painful when they change the bandages. The vascular surgeon has recommended amputation, which his family members have not elected to do at this time given his age and co-morbidities. Family member note pervious treatment with gabapentin, which did not work well for him secondary to making him somnolent. They have concerns about ordering further tests.    4. Late onset Alzheimer's disease without behavioral disturbance (HCC)  The patient has chronic history of late onset Alzheimer's disease. His family members report staff at [facility] are now giving him his medications, as he does not always remember to take them. Family member notes he has been less talkative and sleeps more.    5. Palliative care status  6. Abnormal weight loss  7. Weakness generalized  8. Recurrent falls  His family members have strongly considered palliative care, and possible hospice. He is additionally seen by [facility]. He is losing weight. His family members report he has recently had very loose stool. They note additional symptoms of red, bloody stool.     9. Other glaucoma of right eye  The patient has chronic history of glaucoma of the right eye. Per family, his pressure level is rising and is near 40. It is affecting his vision. His nephew worries he will lose sight in that eye. Patient enjoys watching sports on TV. He will be having eye surgery in the near future, which will have to temporarily stop his Plavix. He has vision loss in his left  eye.     10. Slurred speech  His family members report they have noticed he started slurring his speech from time to time. He has history of a stroke and they wonder if he may be having TIAs. Again, they are cautious about medications and tests given his recent decline.    Current medicines (including changes today)  Current Outpatient Medications   Medication Sig Dispense Refill   • tramadol (ULTRAM) 50 MG Tab Take 1-2 tablets PO 1 hour prior to wound dressing change.  Okay to repeat as needed every 6 hours as needed for severe pain. 80 Tab 0   • NON SPECIFIED Discontinue Plavix.  No plan to restart at this time. 1 Each 0   • NON SPECIFIED Discontinue amlodipine and aspirin.  No plan to restart at this time. 1 Each 0   • clopidogrel (PLAVIX) 75 MG Tab Take 75 mg by mouth every day.     • Homeopathic Products (TRAUMEEL) Ointment Apply 1 Application to affected area(s) 3 times a day as needed (joint pain and muscular pain ).     • Menthol, Topical Analgesic, 2.5 % Gel Apply 1 Application to affected area(s) 3 times a day as needed (pain relief gel).     • ibuprofen (MOTRIN) 200 MG Tab Take 200 mg by mouth every 6 hours as needed. Indications: Mild to Moderate Pain     • amLODIPine (NORVASC) 5 MG Tab Take 1 Tab by mouth every day. 30 Tab 5   • latanoprost (XALATAN) 0.005 % Solution PLACE 1 DROP IN EACH EYE DAILY 2.5 mL 11   • SIMBRINZA 1-0.2 % Suspension 1 Drop by Ophthalmic route 3 times a day.  3   • aspirin EC (ECOTRIN) 81 MG Tablet Delayed Response Take 81 mg by mouth every day.       No current facility-administered medications for this visit.      He  has a past medical history of Anxiety, Arthritis, Cancer (HCC), Cataract, Depression, Glaucoma, Hypertension, IBD (inflammatory bowel disease), Muscle disorder, and Stroke ([facility]).    ROS   Positive for fatigue, sleeping more, diarrhea and blood in stool. No chest pain, no shortness of breath, no abdominal pain.     Objective:     Physical Exam:  /72   Pulse  (!) 54   Temp 36.4 °C (97.5 °F)   Resp 16   SpO2 90%    Constitutional: Alert, chronically-ill male sitting in wheelchair. Falls asleep intermittently during appointment. Difficult to understand due to strong accent.  Skin: Grayish hue to skin, no rashes in visible areas.  Eye: Equal, round and reactive, conjunctiva clear, lids normal.  ENMT: Lips without lesions, good dentition, oropharynx clear.  Neck: Trachea midline, no masses, no thyromegaly.  Respiratory: Unlabored respiratory effort, lungs clear to auscultation, no wheezes, no rhonchi.  Cardiovascular: Normal S1, S2, no murmur, no edema.  Abdomen: Soft, non-tender, no masses, no hepatosplenomegaly.  Extremities: Left foot is bandaged. Dressing is clean, dry and intact.  Psych: Confused at times. normal affect and mood.    Assessment and Plan:     1. Ulcer of toe of left foot, unspecified ulcer stage (HCC)  2. Pain of toe of left foot  3. Peripheral vascular disease (HCC)  4. Late onset Alzheimer's disease without behavioral disturbance (HCC)  5. Abnormal weight loss  6. Weakness generalized  7. Other glaucoma of right eye  8. Slurred speech  9. Recurrent falls  10. Palliative care status  Worsening overall. Patient has had recently had a significant decline. He and his family have declined aggressive treatment for his peripheral vascular disease and ulcers, specifically amputation. He has been receiving home wound care, but this is very painful for him. He has been losing weight, becoming weaker and having some new symptoms of intermittent slurred speech and diarrhea. We had a long discussion around next steps, including aggressive diagnostic studies and medications versus a palliative approach.  We have also had many discussions over the years regarding goals of care and Jt has been adamant that he does not want heroic measures.  Last year, we completed a DNR.  Jt's family and I agreed to have hospice evaluate.      For his pain during dressing  changes, I've prescribed him tramadol.  Due to his kidney function, NSAIDs are contra-indicated.  was reviewed and appropriate. Informed consent signed.    - REFERRAL TO HOSPICE  - tramadol (ULTRAM) 50 MG Tab; Take 1-2 tablets PO 1 hour prior to wound dressing change.  Okay to repeat as needed every 6 hours as needed for severe pain.  Dispense: 80 Tab; Refill: 0  - Consent for Opiate Prescription    Followup: Return if symptoms worsen or fail to improve.          IBishop (Scribe), am scribing for, and in the presence of, Kathrine Melgoza MD    Electronically signed by: Bishop Burr (Scribe), 1/20/2020    I, Kathrine Melgoza MD personally performed the services described in this documentation, as scribed by Bishop Burr in my presence, and it is both accurate and complete.

## 2020-01-30 NOTE — TELEPHONE ENCOUNTER
Can we please call [facility] and request that they re-evaluate Jt for hospice?  They had done an initial assessment a couple of weeks ago.  I have been receiving messages from [facility] and he appears to be declining quickly.  He is having multiple falls, not eating and has new wounds.    Happy to speak with them if needed.  They can contact me on my cell phone.    -Kathrine Melgoza M.D.

## 2020-04-16 ENCOUNTER — APPOINTMENT (OUTPATIENT)
Dept: HOSPICE | Facility: HOSPICE | Age: 85
End: 2020-04-16
Payer: MEDICARE

## 2020-07-25 ENCOUNTER — TELEPHONE ENCOUNTER (OUTPATIENT)
Dept: URBAN - METROPOLITAN AREA CLINIC 13 | Facility: CLINIC | Age: 85
End: 2020-07-25

## 2020-07-25 RX ORDER — BENZONATATE 100 MG/1
TAKE 1 CAPSULE DAILY CAPSULE ORAL
Refills: 0 | OUTPATIENT
End: 2015-11-11

## 2020-07-25 RX ORDER — NIFEDIPINE 30 MG/1
TAKE 1 TABLET DAILY TABLET, EXTENDED RELEASE ORAL
Refills: 0 | OUTPATIENT
End: 2014-09-16

## 2020-07-25 RX ORDER — OLOPATADINE HYDROCHLORIDE 600 UG/1
INSTILL 1 SQUIRT DAILY SPRAY, METERED NASAL
Refills: 0 | OUTPATIENT
End: 2015-08-05

## 2020-07-25 RX ORDER — LATANOPROST/PF 0.005 %
DROPS OPHTHALMIC (EYE)
Qty: 25 | Refills: 0 | OUTPATIENT
Start: 2013-09-03 | End: 2014-10-06

## 2020-07-25 RX ORDER — POLYETHYLENE GLYCOL 3350, SODIUM CHLORIDE, SODIUM BICARBONATE AND POTASSIUM CHLORIDE WITH LEMON FLAVOR 420; 11.2; 5.72; 1.48 G/4L; G/4L; G/4L; G/4L
USE AS DIRECTED POWDER, FOR SOLUTION ORAL
Qty: 1 | Refills: 0 | OUTPATIENT
Start: 2014-09-16 | End: 2014-10-06

## 2020-07-25 RX ORDER — DOXAZOSIN 2 MG/1
TAKE 2 TABLET DAILY TABLET ORAL
Refills: 0 | OUTPATIENT
End: 2014-09-16

## 2020-07-26 ENCOUNTER — TELEPHONE ENCOUNTER (OUTPATIENT)
Dept: URBAN - METROPOLITAN AREA CLINIC 13 | Facility: CLINIC | Age: 85
End: 2020-07-26

## 2020-07-26 RX ORDER — ATENOLOL 25 MG/1
TAKE 1 TABLET DAILY TABLET ORAL
Refills: 0 | Status: ACTIVE | COMMUNITY

## 2020-07-26 RX ORDER — DONEPEZIL HYDROCHLORIDE 10 MG/1
TAKE 1 TABLET DAILY AS DIRECTED TABLET, FILM COATED ORAL
Refills: 0 | Status: ACTIVE | COMMUNITY

## 2020-07-26 RX ORDER — ATORVASTATIN CALCIUM 10 MG/1
TAKE 1 TABLET DAILY TABLET, FILM COATED ORAL
Refills: 0 | Status: ACTIVE | COMMUNITY

## 2020-07-26 RX ORDER — COLCHICINE 0.6 MG/1
USE AS DIRECTED TABLET, FILM COATED ORAL
Refills: 0 | Status: ACTIVE | COMMUNITY

## 2020-07-26 RX ORDER — OMEPRAZOLE 20 MG/1
CAPSULE, DELAYED RELEASE ORAL
Qty: 90 | Refills: 0 | Status: ACTIVE | COMMUNITY
Start: 2014-02-17

## 2020-07-26 RX ORDER — ALLOPURINOL 300 MG/1
TABLET ORAL
Qty: 90 | Refills: 0 | Status: ACTIVE | COMMUNITY
Start: 2014-02-19

## 2020-07-26 RX ORDER — INDAPAMIDE 2.5 MG/1
TAKE 1 TABLET DAILY TABLET, FILM COATED ORAL
Refills: 0 | Status: ACTIVE | COMMUNITY

## 2020-07-26 RX ORDER — SODIUM FLUORIDE 1.1 G/100G
GEL ORAL
Qty: 56 | Refills: 0 | Status: ACTIVE | COMMUNITY
Start: 2013-09-18

## 2020-07-26 RX ORDER — ATENOLOL 50 MG/1
TABLET ORAL
Qty: 30 | Refills: 0 | Status: ACTIVE | COMMUNITY
Start: 2014-01-22

## 2020-07-26 RX ORDER — ATORVASTATIN CALCIUM 10 MG/1
TABLET, FILM COATED ORAL
Qty: 90 | Refills: 0 | Status: ACTIVE | COMMUNITY
Start: 2013-07-29

## 2020-07-26 RX ORDER — COLCHICINE 0.6 MG/1
TABLET, FILM COATED ORAL
Qty: 30 | Refills: 0 | Status: ACTIVE | COMMUNITY
Start: 2014-02-12

## 2020-07-26 RX ORDER — GABAPENTIN 300 MG/1
TAKE 1 CAPSULE 3 TIMES DAILY CAPSULE ORAL
Refills: 0 | Status: ACTIVE | COMMUNITY
Start: 2014-01-17

## 2020-07-26 RX ORDER — ASPIRIN 81 MG/1
TAKE 1 TABLET DAILY TABLET, COATED ORAL
Refills: 0 | Status: ACTIVE | COMMUNITY

## 2020-07-26 RX ORDER — LATANOPROST/PF 0.005 %
INSTILL 1 DROP IN BOTH EYES AT BEDTIME DROPS OPHTHALMIC (EYE)
Refills: 0 | Status: ACTIVE | COMMUNITY

## 2020-07-26 RX ORDER — INDAPAMIDE 2.5 MG/1
TABLET, FILM COATED ORAL
Qty: 30 | Refills: 0 | Status: ACTIVE | COMMUNITY
Start: 2014-01-22